# Patient Record
Sex: FEMALE | Race: WHITE | NOT HISPANIC OR LATINO
[De-identification: names, ages, dates, MRNs, and addresses within clinical notes are randomized per-mention and may not be internally consistent; named-entity substitution may affect disease eponyms.]

---

## 2018-10-24 ENCOUNTER — RESULT REVIEW (OUTPATIENT)
Age: 58
End: 2018-10-24

## 2021-01-12 ENCOUNTER — TRANSCRIPTION ENCOUNTER (OUTPATIENT)
Age: 61
End: 2021-01-12

## 2021-02-09 ENCOUNTER — TRANSCRIPTION ENCOUNTER (OUTPATIENT)
Age: 61
End: 2021-02-09

## 2021-11-29 PROBLEM — Z00.00 ENCOUNTER FOR PREVENTIVE HEALTH EXAMINATION: Status: ACTIVE | Noted: 2021-11-29

## 2022-02-03 ENCOUNTER — APPOINTMENT (OUTPATIENT)
Dept: OBGYN | Facility: CLINIC | Age: 62
End: 2022-02-03
Payer: COMMERCIAL

## 2022-02-03 VITALS
HEIGHT: 62 IN | DIASTOLIC BLOOD PRESSURE: 90 MMHG | WEIGHT: 163 LBS | SYSTOLIC BLOOD PRESSURE: 152 MMHG | BODY MASS INDEX: 30 KG/M2

## 2022-02-03 DIAGNOSIS — Z01.419 ENCOUNTER FOR GYNECOLOGICAL EXAMINATION (GENERAL) (ROUTINE) W/OUT ABNORMAL FINDINGS: ICD-10-CM

## 2022-02-03 PROCEDURE — 99386 PREV VISIT NEW AGE 40-64: CPT

## 2022-02-06 LAB — HPV HIGH+LOW RISK DNA PNL CVX: NOT DETECTED

## 2022-02-10 LAB — CYTOLOGY CVX/VAG DOC THIN PREP: ABNORMAL

## 2022-08-21 DIAGNOSIS — R73.03 PREDIABETES.: ICD-10-CM

## 2022-08-21 DIAGNOSIS — Z87.19 PERSONAL HISTORY OF OTHER DISEASES OF THE DIGESTIVE SYSTEM: ICD-10-CM

## 2022-08-21 DIAGNOSIS — Z80.9 FAMILY HISTORY OF MALIGNANT NEOPLASM, UNSPECIFIED: ICD-10-CM

## 2022-08-26 ENCOUNTER — APPOINTMENT (OUTPATIENT)
Dept: CARDIOLOGY | Facility: CLINIC | Age: 62
End: 2022-08-26

## 2022-08-26 ENCOUNTER — NON-APPOINTMENT (OUTPATIENT)
Age: 62
End: 2022-08-26

## 2022-08-26 VITALS
OXYGEN SATURATION: 97 % | BODY MASS INDEX: 29.81 KG/M2 | SYSTOLIC BLOOD PRESSURE: 130 MMHG | HEART RATE: 83 BPM | DIASTOLIC BLOOD PRESSURE: 88 MMHG | WEIGHT: 163 LBS

## 2022-08-26 DIAGNOSIS — Z86.79 PERSONAL HISTORY OF OTHER DISEASES OF THE CIRCULATORY SYSTEM: ICD-10-CM

## 2022-08-26 DIAGNOSIS — Z86.39 PERSONAL HISTORY OF OTHER ENDOCRINE, NUTRITIONAL AND METABOLIC DISEASE: ICD-10-CM

## 2022-08-26 PROCEDURE — 99204 OFFICE O/P NEW MOD 45 MIN: CPT

## 2022-08-26 PROCEDURE — 93000 ELECTROCARDIOGRAM COMPLETE: CPT

## 2022-08-27 ENCOUNTER — NON-APPOINTMENT (OUTPATIENT)
Age: 62
End: 2022-08-27

## 2022-08-27 PROBLEM — Z86.79 HISTORY OF HYPERTENSION: Status: RESOLVED | Noted: 2022-08-27 | Resolved: 2022-08-27

## 2022-08-27 PROBLEM — Z86.39 HISTORY OF HYPERLIPIDEMIA: Status: RESOLVED | Noted: 2022-08-26 | Resolved: 2022-08-27

## 2022-08-27 RX ORDER — ATENOLOL 50 MG/1
TABLET ORAL
Refills: 0 | Status: ACTIVE | COMMUNITY

## 2022-08-29 RX ORDER — HYDROCHLOROTHIAZIDE 12.5 MG/1
TABLET ORAL
Refills: 0 | Status: ACTIVE | COMMUNITY
Start: 2022-07-16

## 2022-08-29 NOTE — PHYSICAL EXAM
[Normal Conjunctiva] : normal conjunctiva [Normal S1, S2] : normal S1, S2 [Clear Lung Fields] : clear lung fields [Soft] : abdomen soft [Non Tender] : non-tender [Normal Bowel Sounds] : normal bowel sounds [Normal Gait] : normal gait [de-identified] : Appears in no distress lying flat [de-identified] : No carotid bruit. No jugular venous distention [de-identified] : No murmur. No gallop. No diastolic sounds. [de-identified] : full range of motion [de-identified] : dorsalis pedis pulses +2 bilaterally. Feet warm and well-perfused. No ulcerations. No peripheral edema

## 2022-08-29 NOTE — HISTORY OF PRESENT ILLNESS
[FreeTextEntry1] : 62-year-old female\par Cardiology consultation requested by Dr. Lane because of a diagnosis of non-arteritic ischemic optic neuropoathy\par \par Olya has no known heart disease. There is no history of myocardial infarction angina congestive heart failure or cerebrovascular accident. A screening stress echocardiographic study in 10/19 was normal. The left ventricle ejection fraction was greater than 60%.\par \par In 7/22 ago there was sudden loss of vision in one aspect of the left eye.  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 There has been no improvement in visual acuity\par \par An 8 /22  carotid CTA showed no evidence of greater than 50% stenosis of the internal carotid arteries. Atherosclerotic calcifications were seen at the left carotid bifurcation\par \par There is a prior history of hypertension and prediabetes. There is no history of smoking or illicit drug use. Her grandfather had a myocardial infarction.\par \par Enma denies chest pain, shortness of breath, palpitations or syncope.\par \par Mrs Caldwell  presents today for cardiovascular evaluation

## 2022-08-29 NOTE — DISCUSSION/SUMMARY
[FreeTextEntry1] : Visual disturbance\par In 7/22 ago there was sudden loss of vision in one aspect of the left eye.  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 There has been no improvement in visual acuity\par \par An 8 /22  carotid CTA showed no evidence of greater than 50% stenosis of the internal carotid arteries. Atherosclerotic calcifications were seen at the left carotid bifurcation\par \par Noninvasive cardiac studies would be helpful for further evaluation\par \par I have recommended the following\par a. Risk factor modification\par b. Noninvasive cardiac studies to include\par   1. echocardiogram\par   2 . Exercise treadmill ECG study\par \par \par \par \par Hyperlipidemia\par Hyperlipidemia represents a risk factor for atherosclerotic heart disease. The target LDL level for primary prevention is about 100. Despite diet exercise and  weight loss lipid levels have been elevated.  In 3/22 the serum cholesterol level was 239 HDL 60 triglycerides 136 and . HMG co A  reductase inhibitor therapy is indicated in an effort to obtain  more optimal levels. Nonpharmacological therapy, specifically diet exercise and weight loss are  emphasized   as major aspects of treatment.\par \par I have recommended the following\par a. Low fat low cholesterol  diabetic  diet. Regular aerobic exercise and weight loss\par b. Target LDL  level to  about 100 as discussed above\par c. Atorvastatin 10 mg/dayy with further dose adjustment dictated by tolerance and response\par d. Screening stress treadmill ECG study.\par \par \par \par Hypertension\par Hypertension is reportedly  controlled on the present medical regimen. In the setting of risk factors for atherosclerotic heart disease and prediabetes  ACE-I/ARB therapy is attractive.\par \par I have recommended  the following:\par \par a. Low-fat low-cholesterol diabetic diet. Regular aerobic exercise weight loss\par b. Continue the present medical regimen\par c. Addition of ACE-I./ARB therapy if required to maintain optimal levels\par d. Home blood pressure monitoring\par \par \par \par \par \par \par Valvular heart disease\par The 10/19 echocardiogram revealed mild mitral regurgitation. The pulmonary systolic pressure was normal at 23 mmHg. Left ventricular ejection fraction was greater than 60% The present cardiac physical examination is not suggestive is severe mitral regurgitation. Echocardiography would be helpful to reassess left ventricular and valvular function\par \par I have  recommended  the following\par a. Echocardiogram\par \par \par \par \par Overweight\par Despite significant recent weight loss  Enma  remains overweight. Today Mrs. Caldwell is 5 feet 2 inches tall and weighs 163 pounds. Diet exercise and weight loss are advised.\par \par \par \par \par \par The diagnosis, prognosis, risks, options and alternatives were explained at length to the patient. All questions were answered. Issues discussed included atherosclerotic heart disease hyperlipidemia hypertension visual disturbance noninvasive cardiac testing diet and exercise

## 2022-08-29 NOTE — REVIEW OF SYSTEMS
[Feeling Fatigued] : feeling fatigued [Joint Pain] : joint pain [Negative] : Heme/Lymph [FreeTextEntry3] : see history of present illness [FreeTextEntry5] : see history of present illness [de-identified] : see  history of present illness

## 2022-09-02 ENCOUNTER — APPOINTMENT (OUTPATIENT)
Dept: NEUROLOGY | Facility: CLINIC | Age: 62
End: 2022-09-02

## 2022-09-09 ENCOUNTER — NON-APPOINTMENT (OUTPATIENT)
Age: 62
End: 2022-09-09

## 2022-09-12 ENCOUNTER — RESULT REVIEW (OUTPATIENT)
Age: 62
End: 2022-09-12

## 2022-09-13 ENCOUNTER — APPOINTMENT (OUTPATIENT)
Dept: NEUROLOGY | Facility: CLINIC | Age: 62
End: 2022-09-13

## 2022-09-14 ENCOUNTER — NON-APPOINTMENT (OUTPATIENT)
Age: 62
End: 2022-09-14

## 2022-09-14 DIAGNOSIS — R93.89 ABNORMAL FINDINGS ON DIAGNOSTIC IMAGING OF OTHER SPECIFIED BODY STRUCTURES: ICD-10-CM

## 2022-09-20 ENCOUNTER — RESULT REVIEW (OUTPATIENT)
Age: 62
End: 2022-09-20

## 2022-09-20 ENCOUNTER — TRANSCRIPTION ENCOUNTER (OUTPATIENT)
Age: 62
End: 2022-09-20

## 2022-09-26 ENCOUNTER — APPOINTMENT (OUTPATIENT)
Dept: NEUROLOGY | Facility: CLINIC | Age: 62
End: 2022-09-26

## 2022-09-26 VITALS
HEIGHT: 62 IN | WEIGHT: 163 LBS | SYSTOLIC BLOOD PRESSURE: 128 MMHG | DIASTOLIC BLOOD PRESSURE: 82 MMHG | OXYGEN SATURATION: 98 % | HEART RATE: 70 BPM | BODY MASS INDEX: 30 KG/M2 | TEMPERATURE: 98.7 F

## 2022-09-26 DIAGNOSIS — Z78.9 OTHER SPECIFIED HEALTH STATUS: ICD-10-CM

## 2022-09-26 DIAGNOSIS — Z81.8 FAMILY HISTORY OF OTHER MENTAL AND BEHAVIORAL DISORDERS: ICD-10-CM

## 2022-09-26 PROCEDURE — 99215 OFFICE O/P EST HI 40 MIN: CPT

## 2022-09-26 NOTE — ASSESSMENT
[FreeTextEntry1] : Impression: optic neuritis: possibly related to COVID infection as symptom onset corresponded to getting COVID. Could be idiopathic. Less likely autoimmune or paraneoplastic. Improving with steroids. Low suspicion for meningioma given appearance and improvement with steroids \par \par Please continue prednisone with gradual taper (60 mg with taper by 10 mg q week) --> Let us know if worsening in vision. Vision can worsen if steroids are tapered too rapidly with perineuritis. \par \par Take with pantoprazole \par Continue aspirin 81 mg \par Monitor blood sugar with primary care doctor \par \par Please follow-up with Dr. Vázquez  - neurosurgery  \par Please follow-up with hematology - Dr. Valdez\par Please follow-up with primary care doctor and Dr. Benitez - elevated inflammatory markers\par Please follow-up with neuro-ophthalmology\par \par Take vitamin D 1000 units daily \par \par RTC in 1 month to see Dr. Costa - neurology\par

## 2022-09-26 NOTE — CONSULT LETTER
[Dear  ___] : Dear ~LIGIA, [Courtesy Letter:] : I had the pleasure of seeing your patient, [unfilled], in my office today. [Please see my note below.] : Please see my note below. [Sincerely,] : Sincerely, [FreeTextEntry3] : Martín Marie MD \par [facility] \par

## 2022-09-26 NOTE — DATA REVIEWED
[de-identified] : 9/2022 \par MRI Brain/Orbits: \par FINDINGS: \par MRI brain:\par Diffusion imaging is negative for recent infarct. Susceptibility images show no parenchymal blood product deposition. There are preserved large vessel flow-voids. Ventricles, sulci and cisterns are normal in size and configuration. T2-FLAIR imaging is unremarkable aside from scattered minute foci of subcortical white matter signal which is nonspecific but statistically most likely manifestation of minor small vessel ischemia, essentially unremarkable and not unexpected for age. There is no mass or fluid collection. There is no abnormal enhancement within the brain parenchyma or elsewhere in the intracranial compartment.\par MRI orbits:\par On the T2-weighted images, no pathologic signal or gross enlargement is identified of either optic nerve. However, on postcontrast images, there is considerable perineural enhancement at the mid and posterior segments of the optic nerve, for example seen is a rind of enhancing tissue surrounding the nerve on series 25, image 28 and with linear extent along the nerve seen on axial series 23, image 14. Caliber of the nerve is similar to the right without neural compression. Enhancement is thin and not nodular, and no calcification is seen on recent CT to suggest meningioma. There is corresponding hazy and subtle STIR signal abnormality at this site on series 20 within and intraconal location. No fluid collection. Enhancement appears to terminate at the optic canal, without pathologic enhancement seen of the intracranial optic nerve, nor is there gross signal abnormality or mass effect on the optic chiasm. The right orbits shows no abnormal enhancement. Globes appear symmetrically intact, and there is no gross abnormality of the extraocular muscles or lacrimal glands.\par IMPRESSION: \par On orbit MRI, there is ill-defined enhancement surrounding the left optic nerve at its mid and posterior orbital segments, without gross intraneural enhancement. 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 Sarcoidosis, IgG4 disease, SLE, IBD may manifest with orbital inflammation. No mass lesion or compression of the nerve. Correlate for a response to steroids.\par Brain MRI is essentially unremarkable. No intracranial mass.\par \par MRI C-Spine\par FINDINGS: \par Bones/joints: Straightened cervical curvature with normal vertebral body \par heights and alignments. \par Spinal cord: Normal signal. No cord compression. \par Discs/Spinal canal/Neural foramina: C4-C7 small disc bulges cause up to mild \par stenosis. \par Vasculature: Expected flow voids in the vertebral arteries.  \par Soft tissues: Unremarkable \par IMPRESSION: \par Mild cervical spondylosis. No acute abnormalities.\par \par MRI T spine \par \par \par FINDINGS: \par Bones/joints: Unremarkable. \par Spinal cord: Normal signal. No cord compression. \par Discs/Spinal canal/Neural foramina:  No significant disc disease. No \par significant spinal canal stenosis. \par Soft tissues: Unremarkable. \par IMPRESSION: \par Unremarkable spine.\par \par CTA head/neck \par FINDINGS: \par Moderate bilateral cavernous carotid artery calcifications without high-grade stenosis.\par ANTERIOR CIRCULATION: \par Right internal carotid artery: Unremarkable. Intracranial segment is patent with no significant stenosis. No aneurysm. \par Right middle cerebral artery: Unremarkable. No occlusion or significant stenosis. No aneurysm.  \par Right anterior cerebral artery: Unremarkable. No occlusion or significant stenosis. No aneurysm.  \par \par Left internal carotid artery: Unremarkable. Intracranial segment is patent with no significant stenosis. No aneurysm. \par Left middle cerebral artery: Unremarkable. No occlusion or significant stenosis. No aneurysm.  \par Left anterior cerebral artery: Unremarkable. No occlusion or significant stenosis. No aneurysm.  \par \par POSTERIOR CIRCULATION: \par Right vertebral artery: Unremarkable. No occlusion or significant stenosis. No aneurysm.  \par Left vertebral artery: Unremarkable. No occlusion or significant stenosis. No aneurysm.  \par Basilar artery: Unremarkable. No occlusion or significant stenosis. No aneurysm. \par Right posterior cerebral artery: Fetal origin of the right posterior cerebral artery is a common developmental variant and there is no occlusion or significant stenosis. No aneurysm. \par Left posterior cerebral artery: Unremarkable. No occlusion or significant stenosis. No aneurysm.  \par \par \par IMPRESSION: \par No large vessel stenosis or occlusion detected involving the major branches of the anterior or posterior intracranial circulation. \par \par \par =========================\par \par \par Exam: CTA Neck With Contrast \par Exam date and time: 8/12/2022 7:29 PM \par Age: 62 years old Clinical indication: Visual disturbance \par \par TECHNIQUE: Imaging protocol: Computed tomographic angiography of the neck with contrast. \par \par COMPARISON: CT HEAD 8/12/2022 7:26 PM \par \par FINDINGS: \par Right common carotid artery: No stenosis. No dissection or occlusion. \par Right internal carotid artery: Atherosclerotic calcifications are seen at the right carotid bifurcation and involving the proximal segment of the right internal carotid artery with no evidence of 50% or greater stenosis of the extracranial segment. No dissection or occlusion. \par Right external carotid artery: No occlusion or stenosis of the origin.  \par \par Left common carotid artery: No stenosis. No dissection or occlusion. \par Left internal carotid artery: Atherosclerotic calcifications are seen at the left carotid bifurcation with no evidence of 50% or greater stenosis of the extracranial segment. No dissection or occlusion. \par Left external carotid artery: No occlusion or stenosis of the origin.  \par \par Right vertebral artery: No stenosis. No dissection or occlusion. \par Left vertebral artery: No stenosis. No dissection or occlusion. \par \par Soft tissues: Normal. No significant soft tissue swelling. \par \par Bones/joints: No acute fracture. \par \par IMPRESSION: \par No evidence of 50% or greater stenosis involving the cervical segments of the \par right or left internal carotid arteries by NASCET criteria. \par \par  [de-identified] : 9/21/22; \par \par TSH 0.22, Free T4 0.96, Ferritin 446 high, ESR 37: high, CSF C-reactive protein 10.1, CSF  high \par Vitamin D: 29.7 \par \par Serum MOG antibody negative, serum NMO negative, serum IgG index: WNL \par Serum SSA, SSB negative,  Scleroderma antigody negative CSF MBP pening, CSF oligoclonal band pending CSF ACE pending, CSF lyme, CSF MOG pending, CSF HSV pending\par Serum RF negative, PEÑA negative, P-anca - negatie, Atypical anca indeterminate \par NMO serum antibody negative \par \par CSF WBC: 2--> 1, CSF protein: 36, CSF glucose 99 \par CSF gram statin, culture - no growth \par CSF Cytology: negative for malignancy \par \par 9/18/22:  COVID positive \par 9/22/22: COVID negative

## 2022-09-26 NOTE — DISCUSSION/SUMMARY
[FreeTextEntry1] : 62-year-old right-handed woman with a past medical history of HTN, HLD, preDM who presents with worsening of intermittent left eye vision changes x 2 months. 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 Perineuritis can be idiopathic or associated with autoimmune disease - lower association with demyelinating disease such as MS. \par \par MRI brain/cervical spine/thoracic spine w/wo contrast negative for other inflammatory lesions or longitudinally extensive optic neuritis. CSF was bland with normal WBC and protein. CSF cytology negative. Serum NMO, MOG negative. CSF oligoclonal band, MOG, NMO pending \par \par Serologic work-up for autoimmune disease negative (PEÑA, SSA, ANCA, RF). Inflammatory markers are elevated - LDH, ESR, CPR, ferritin. Inflammatory markers are up in setting of positive COVID infection. \par \par Perineuritis can be associated with  IgG4 related disease, giant cell arteritis, Behcets diseae, HSV, herpes zoster, granulomatosis disease with polyangiitis, lupus, inflammatory bowel disease, tuberculosis, syphilis, HSV, herpes zoster viral encephalitis , primary or metastatic malignancy , Graves Disease. \par \par CT chest/abdomen and pelvis if above tests unrevealing (malignancy work-up), consideration of special send out testing for IgG4. NMO, MOG in process. Elevated inflammatory markers although confound is COVID positive  - she is asymptomatic. MRI brain/spine without lesions. \par

## 2022-09-26 NOTE — HISTORY OF PRESENT ILLNESS
[FreeTextEntry1] : Enma is a 62-year-old right-handed woman with a past medical history of pre-diabetes, recently diagnosed left optic perineuritis and hypertension presenting for hospital follow-up. \par She was hospitalized at [facility] from -22. \par \par She reports that around , she didn't feel well. She tested positive for COVID. Symptoms were just a mild cold. No shortness of breath. The following week, she noticed that vision in her left eye was blurry. It lasted about a day or so. A few days later, she noticed a black spot in the left eye nasal visual field. She went to an ophthalmologist who didn't see papillitis/anything wrong and asked her to come to the emergency room. She came to the emergency room at [facility] on 22. She had a CTA head/neck which was normal. She was recommended to get a MRI orbits/brain as outpatient. \par \par She returned to [facility] ED on  reporting worsening vision in left eye with a black spot and discomfort. She was admitted and had an MRI of brain which showed appearance of possible left optic perineuritis. She tested positive for COVID but was asymptomatic. \par She was treated with 3 days of high dose solumedrol 1 g and reported improvement in vision and discomfort the next day. MRI cervical and thoracic spine without lesions. She had an IR guided LP which was bland. Oligoclonal bands, NMO, and MOG in CSF are still pending. CSF cytology and cultures were negative.\par Rheumatologic labs were unrevealing. Inflammatory markers including ESR, CRP, ferritin, LDH were up. \par \par She is presenting for follow-up. She is taking prednisone with taper by 10 mg as instructed. She is taking PPI and aspirin 81 mg (instructed because inflammatory markers are up). \par She has follow-up with primary care and with cardiologist coming up. 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 \par \par No side-effects from prednisone apart from insomnia. She is not drinking alcohol. \par \par No history of blood clots. No history of spontaneous miscarriage. Pap smears were up to date. Keeping up with mammograms. No history of cancer. \par Vaccinated against COVID x2 and has one booster shot. \par \par Past Medical History\par pre-diabetes\par hypertension\par \par Surgeries: \par \par \par Social History\par Used to drink wine, a couple glasses a day\par No cigarettes \par Retired, used to work as a dental hygienist \par Drives \par \par Medications:\par vitamin D 1000 units qd \par aspirin 81\par pantoprazole\par prednisone 60 mg qd \par metformin 500 mg qd\par hctz 25 mg qd \par atenolol 25 mg qd\par atorvastatin 10 mg qd\par dorzolamide? \par \par Primary Care Doctor:\par Dr. Lane \par Dr. Gaurang Smith - neuroophthalmology\par \par Family History: \par Mother -  - 87 Alzheimers\par Father - - 67 esophageal cancer\par Brother- non pertinent medical history\par 3 healthy children \par \par Investigations: \par \par MRI Brain/Orbits: \par FINDINGS: \par MRI brain:\par Diffusion imaging is negative for recent infarct. Susceptibility images show no parenchymal blood product deposition. There are preserved large vessel flow-voids. Ventricles, sulci and cisterns are normal in size and configuration. T2-FLAIR imaging is unremarkable aside from scattered minute foci of subcortical white matter signal which is nonspecific but statistically most likely manifestation of minor small vessel ischemia, essentially unremarkable and not unexpected for age. There is no mass or fluid collection. There is no abnormal enhancement within the brain parenchyma or elsewhere in the intracranial compartment.\par MRI orbits:\par On the T2-weighted images, no pathologic signal or gross enlargement is identified of either optic nerve. However, on postcontrast images, there is considerable perineural enhancement at the mid and posterior segments of the optic nerve, for example seen is a rind of enhancing tissue surrounding the nerve on series 25, image 28 and with linear extent along the nerve seen on axial series 23, image 14. Caliber of the nerve is similar to the right without neural compression. Enhancement is thin and not nodular, and no calcification is seen on recent CT to suggest meningioma. There is corresponding hazy and subtle STIR signal abnormality at this site on series 20 within and intraconal location. No fluid collection. Enhancement appears to terminate at the optic canal, without pathologic enhancement seen of the intracranial optic nerve, nor is there gross signal abnormality or mass effect on the optic chiasm. The right orbits shows no abnormal enhancement. Globes appear symmetrically intact, and there is no gross abnormality of the extraocular muscles or lacrimal glands.\par IMPRESSION: \par On orbit MRI, there is ill-defined enhancement surrounding the left optic nerve at its mid and posterior orbital segments, without gross intraneural enhancement. 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 Sarcoidosis, IgG4 disease, SLE, IBD may manifest with orbital inflammation. No mass lesion or compression of the nerve. Correlate for a response to steroids.\par Brain MRI is essentially unremarkable. No intracranial mass.\par \par MRI C-Spine\par FINDINGS: \par Bones/joints: Straightened cervical curvature with normal vertebral body \par heights and alignments. \par Spinal cord: Normal signal. No cord compression. \par Discs/Spinal canal/Neural foramina: C4-C7 small disc bulges cause up to mild \par stenosis. \par Vasculature: Expected flow voids in the vertebral arteries.  \par Soft tissues: Unremarkable \par IMPRESSION: \par Mild cervical spondylosis. No acute abnormalities.\par \par MRI T spine \par \par \par FINDINGS: \par Bones/joints: Unremarkable. \par Spinal cord: Normal signal. No cord compression. \par Discs/Spinal canal/Neural foramina:  No significant disc disease. No \par significant spinal canal stenosis. \par Soft tissues: Unremarkable. \par IMPRESSION: \par Unremarkable spine.\par \par CTA head/neck \par FINDINGS: \par Moderate bilateral cavernous carotid artery calcifications without high-grade stenosis.\par ANTERIOR CIRCULATION: \par Right internal carotid artery: Unremarkable. Intracranial segment is patent with no significant stenosis. No aneurysm. \par Right middle cerebral artery: Unremarkable. No occlusion or significant stenosis. No aneurysm.  \par Right anterior cerebral artery: Unremarkable. No occlusion or significant stenosis. No aneurysm.  \par \par Left internal carotid artery: Unremarkable. Intracranial segment is patent with no significant stenosis. No aneurysm. \par Left middle cerebral artery: Unremarkable. No occlusion or significant stenosis. No aneurysm.  \par Left anterior cerebral artery: Unremarkable. No occlusion or significant stenosis. No aneurysm.  \par \par POSTERIOR CIRCULATION: \par Right vertebral artery: Unremarkable. No occlusion or significant stenosis. No aneurysm.  \par Left vertebral artery: Unremarkable. No occlusion or significant stenosis. No aneurysm.  \par Basilar artery: Unremarkable. No occlusion or significant stenosis. No aneurysm. \par Right posterior cerebral artery: Fetal origin of the right posterior cerebral artery is a common developmental variant and there is no occlusion or significant stenosis. No aneurysm. \par Left posterior cerebral artery: Unremarkable. No occlusion or significant stenosis. No aneurysm.  \par \par \par IMPRESSION: \par No large vessel stenosis or occlusion detected involving the major branches of the anterior or posterior intracranial circulation. \par \par \par =========================\par \par \par Exam: CTA Neck With Contrast \par Exam date and time: 2022 7:29 PM \par Age: 62 years old Clinical indication: Visual disturbance \par \par TECHNIQUE: Imaging protocol: Computed tomographic angiography of the neck with contrast. \par \par COMPARISON: CT HEAD 2022 7:26 PM \par \par FINDINGS: \par Right common carotid artery: No stenosis. No dissection or occlusion. \par Right internal carotid artery: Atherosclerotic calcifications are seen at the right carotid bifurcation and involving the proximal segment of the right internal carotid artery with no evidence of 50% or greater stenosis of the extracranial segment. No dissection or occlusion. \par Right external carotid artery: No occlusion or stenosis of the origin.  \par \par Left common carotid artery: No stenosis. No dissection or occlusion. \par Left internal carotid artery: Atherosclerotic calcifications are seen at the left carotid bifurcation with no evidence of 50% or greater stenosis of the extracranial segment. No dissection or occlusion. \par Left external carotid artery: No occlusion or stenosis of the origin.  \par \par Right vertebral artery: No stenosis. No dissection or occlusion. \par Left vertebral artery: No stenosis. No dissection or occlusion. \par \par Soft tissues: Normal. No significant soft tissue swelling. \par \par Bones/joints: No acute fracture. \par \par IMPRESSION: \par No evidence of 50% or greater stenosis involving the cervical segments of the \par right or left internal carotid arteries by NASCET criteria. \par \par \par 22; \par \par TSH 0.22, Free T4 0.96, Ferritin 446 high, ESR 37: high, CSF C-reactive protein 10.1, CSF  high \par Vitamin D: 29.7 \par \par Serum MOG antibody negative, serum NMO negative, serum IgG index: WNL \par Serum SSA, SSB negative,  Scerlodermia antigody negative CSF MBP pening, CSF oligoclonal band pending CSF ACE pending, CSF lyme, CSF MOG pending, CSF HSV pending\par Serum RF negative, PEÑA negative, P-anca - negatie, Atypical anca indeterminate \par NMO serum antibody negative \par \par CSF WBC: 2--> 1, CSF protein: 36, CSF glucose 99 \par CSF gram statin, culture - no growth \par CSF Cytology: negative for malignancy \par \par 22 COVID negative \par 22:  COVID positive \par 22: COVID negative\par

## 2022-09-26 NOTE — PHYSICAL EXAM
[FreeTextEntry1] : Mental Status: AxOx3, speech: no dysarthria, euthymic affect, mood “good”, attention normal by serial sevens, can spell “world” backwards \par STM 3/3 immediate, 3/3 at five minutes, \par CN: visual acuity, left eye nasal superior quadrantanopia\par III, IV, VI, PERRL, EOMI \par V sensation normal to light touch, pinprick\par VII normal squint vs resistance, normal smile, face symmetric \par VIII: normal hearing\par IX, X normal gag, symmetric palate, uvula raises midline \par XI normal shrug versus resistance and lateralization of head versus resistance \par XII tongue symmetric, normal strength, no tremor fasciculations \par Motor: full strength throughout \par Sensory: normal to LT and vibration \par Reflexes \par Brachioradialis 2+, biceps 3+, triceps 2+, patella 3+ and ankles 2+ - all symmetric\par Plantar flexor response bilaterally \par Coordination: no dysmetria on FNF \par Gait : normal balance and gait, no ataxic movements, able to toe /heel/tandem \par \par

## 2022-10-03 ENCOUNTER — APPOINTMENT (OUTPATIENT)
Dept: CARDIOLOGY | Facility: CLINIC | Age: 62
End: 2022-10-03

## 2022-10-03 DIAGNOSIS — I34.0 NONRHEUMATIC MITRAL (VALVE) INSUFFICIENCY: ICD-10-CM

## 2022-10-03 PROCEDURE — 93306 TTE W/DOPPLER COMPLETE: CPT

## 2022-10-03 PROCEDURE — 36415 COLL VENOUS BLD VENIPUNCTURE: CPT

## 2022-10-07 DIAGNOSIS — Z86.79 PERSONAL HISTORY OF OTHER DISEASES OF THE CIRCULATORY SYSTEM: ICD-10-CM

## 2022-10-10 ENCOUNTER — APPOINTMENT (OUTPATIENT)
Dept: NEUROSURGERY | Facility: CLINIC | Age: 62
End: 2022-10-10

## 2022-10-10 VITALS
HEART RATE: 97 BPM | WEIGHT: 163 LBS | DIASTOLIC BLOOD PRESSURE: 89 MMHG | SYSTOLIC BLOOD PRESSURE: 149 MMHG | HEIGHT: 62 IN | BODY MASS INDEX: 30 KG/M2

## 2022-10-10 PROCEDURE — 99205 OFFICE O/P NEW HI 60 MIN: CPT

## 2022-10-10 NOTE — HISTORY OF PRESENT ILLNESS
[de-identified] : PAULA FERGUSON is a 62 year old right handed female with a PMH of  pre-DM (on meds now due to oral steroids), HTN, HLD, recently diagnosed left optic neuritis who presents to the office today at the request of her neurologist Dr. Marie for neurosurgical consultation due to left eye visual disturbance. On  she did not feel well and tested positive for COVID. The following week she developed left eye blurry vision with a black spot in left nasal visual field. She was evaluated at [facility] with normal head CT and discharged with plan for outpatient MRI brain and orbits with and without contrast  which was done on 22 (see detailed reports below). She then was admitted to [facility] - for worsening left eye vision and further work-up. LP CSK culture and cytology was negative. Rheumatology and hematology work-up still pending. She had evaluation by Dr. 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 She is still on prednisone taper, started at 60mg/day, currently taking 4mg/day. Today she reports her left eye vision is significantly improved. She no longer has overall left eye blurry vision, but continues to have the same black spot at left upper nasal visual field. The patient denies seizures, headaches, N/V, hearing deficits, numbness, weakness, bowel/bladder dysfunction or gait disturbance. \par \par \par PMH: per HPI\par PSH: \par FamHx: mother alzhiemer's, father esophageal cancer\par Social Hx: non-smoker, 3 glasses of wine daily,  live with , retired dental hygienist \par Allergies: malignant hyperthermia\par Medications: ASA 81mg, HCTZ 25, metformin 500mg, januvia, atenolol, atorvastatin\par \par Dr. Restrepo PCP\par Hematologist Dr. José Miguel plunkettt in November\par

## 2022-10-10 NOTE — DATA REVIEWED
[de-identified] : Exam: CT Head With Contrast \par Exam date and time: 8/12/2022 7:38 PM \par Age: 62 years old Clinical indication: Visual disturbance \par \par TECHNIQUE: Imaging protocol: Computed tomography of the head with intravenous contrast. \par \par COMPARISON: CT HEAD 8/12/2022 7:26 PM \par \par FINDINGS: \par Brain: Cerebral sulci show bilateral symmetry with no supratentorial mass or mass effect detected. Brainstem and cerebellum are unremarkable. There is no evidence of acute transcortical infarction or recent intracranial hemorrhage. \par No abnormal intracranial enhancement is detected. \par Cerebral ventricles: Ventricular and cisternal spaces are normal in size and configuration and there is no midline shift or hydrocephalus seen. Bones/joints: Bony calvarium and skull base are intact and no acute fractures are detected. \par Paranasal sinuses: Grossly clear throughout. \par Mastoid air cells: Grossly clear bilaterally. \par Soft tissues: Unremarkable. \par Consider MRI of the brain as clinically warranted.\par \par \par IMPRESSION: \par Unremarkable enhanced head CT with no evidence of an acute intracranial process.\par Preliminary report provided by [facility] José Manuel Felipe MD. \par \par --- End of Report ---\par \par ***Electronically Signed ***\par -----------------------------------------------\par Cathy Badillo MD              08/13/22 0912\par \par Dictated on 08/13/22\par \par \par Report cc:  Britt Robertson NP;\par  [de-identified] : Exam: CTA Head With Contrast, Arteriography \par Exam date and time: 8/12/2022 7:29 PM \par Age: 62 years old Clinical indication: Visual disturbance \par \par TECHNIQUE: \par Imaging protocol: Computed tomographic angiography of the head with contrast. \par Exam focused on the arteries. 3D rendering (Not supervised by radiologist): MIP and/or 3D reconstructed images were created by the technologist. \par \par COMPARISON: CT HEAD 8/12/2022 7:26 PM \par \par FINDINGS: \par Moderate bilateral cavernous carotid artery calcifications without high-grade stenosis.\par ANTERIOR CIRCULATION: \par Right internal carotid artery: Unremarkable. Intracranial segment is patent with no significant stenosis. No aneurysm. \par Right middle cerebral artery: Unremarkable. No occlusion or significant stenosis. No aneurysm.  \par Right anterior cerebral artery: Unremarkable. No occlusion or significant stenosis. No aneurysm.  \par \par Left internal carotid artery: Unremarkable. Intracranial segment is patent with no significant stenosis. No aneurysm. \par Left middle cerebral artery: Unremarkable. No occlusion or significant stenosis. No aneurysm.  \par Left anterior cerebral artery: Unremarkable. No occlusion or significant stenosis. No aneurysm.  \par \par POSTERIOR CIRCULATION: \par Right vertebral artery: Unremarkable. No occlusion or significant stenosis. No aneurysm.  \par Left vertebral artery: Unremarkable. No occlusion or significant stenosis. No aneurysm.  \par Basilar artery: Unremarkable. No occlusion or significant stenosis. No aneurysm. \par Right posterior cerebral artery: Fetal origin of the right posterior cerebral artery is a common developmental variant and there is no occlusion or significant stenosis. No aneurysm. \par Left posterior cerebral artery: Unremarkable. No occlusion or significant stenosis. No aneurysm.  \par \par \par IMPRESSION: \par No large vessel stenosis or occlusion detected involving the major branches of the anterior or posterior intracranial circulation. \par  [de-identified] : Exam Date: 	  09/12/22					\par Exam: 	MRI BRAIN WAW IC; MRI ORBITS/FACE OR NECK WAW IC					\par Order#:	[id number]; [id number]					\par             \par \par \par PROCEDURES:\par MRI examination of the brain with and without contrast.\par MRI examination of the orbits with and without contrast.\par \par INDICATION: Left vision loss\par \par TECHNIQUE: Brain MR: Axial volumetric SPGR and sagittal volumetric T2-FLAIR imaging is obtained with MPR provided for each set. Axial T2, SWI and diffusion imaging of the brain is obtained. After 7 cc Gadavist given intravenously, axial volumetric SPGR imaging of the brain is repeated with MPR provided and sagittal T1 and T2-FLAIR spin-echo images are also obtained of the brain.\par \par Orbit MR: Axial oblique T1 and T2 images through the orbits are obtained as well as coronal T1, coronal inversion recovery, and bilateral oblique sagittal T2 series. In addition, steady-state free precession (CISS) images are obtained to the cranial nerves. After contrast given (type and dose above), axial and coronal T1 fat-saturated images of the orbits are obtained.\par \par COMPARISON: No prior MR. CT head from 08/12/2022 is reviewed\par \par FINDINGS: \par \par MRI brain:\par \par Diffusion imaging is negative for recent infarct. Susceptibility images show no parenchymal blood product deposition. There are preserved large vessel flow-voids. Ventricles, sulci and cisterns are normal in size and configuration. T2-FLAIR imaging is unremarkable aside from scattered minute foci of subcortical white matter signal which is nonspecific but statistically most likely manifestation of minor small vessel ischemia, essentially unremarkable and not unexpected for age. There is no mass or fluid collection. There is no abnormal enhancement within the brain parenchyma or elsewhere in the intracranial compartment.\par \par MRI orbits:\par \par On the T2-weighted images, no pathologic signal or gross enlargement is identified of either optic nerve. However, on postcontrast images, there is considerable perineural enhancement at the mid and posterior segments of the optic nerve, for example seen is a rind of enhancing tissue surrounding the nerve on series 25, image 28 and with linear extent along the nerve seen on axial series 23, image 14. Caliber of the nerve is similar to the right without neural compression. Enhancement is thin and not nodular, and no calcification is seen on recent CT to suggest meningioma. There is corresponding hazy and subtle STIR signal abnormality at this site on series 20 within and intraconal location. No fluid collection. Enhancement appears to terminate at the optic canal, without pathologic enhancement seen of the intracranial optic nerve, nor is there gross signal abnormality or mass effect on the optic chiasm. The right orbits shows no abnormal enhancement. Globes appear symmetrically intact, and there is no gross abnormality of the extraocular muscles or lacrimal glands.\par \par \par IMPRESSION: \par \par On orbit MRI, there is ill-defined enhancement surrounding the left optic nerve at its mid and posterior orbital segments, without gross intraneural enhancement. 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 Sarcoidosis, IgG4 disease, SLE, IBD may manifest with orbital inflammation. No mass lesion or compression of the nerve. Correlate for a response to steroids.\par \par Brain MRI is essentially unremarkable. No intracranial mass.\par \par --- End of Report ---\par \par ***Electronically Signed ***\par -----------------------------------------------\par Chacorta Celaya MD              09/13/22 1656\par \par Dictated on 09/13/22\par \par \par Report cc:  Denver Sanchez MD;\par \par

## 2022-10-10 NOTE — ASSESSMENT
[FreeTextEntry1] : I have discussed the natural history and treatment options for optic nerve sheath meningiomas vs optic neuritis with the patient. I explained the different types of meningiomas and the indications for observation and imaging surveillance, medical management with antiepileptic medications and steroids, chemotherapy, radiation therapy, radiosurgery and surgery. I explained the indications of a combination of these treatment options. \par In the end, I recommend repeat MRI brain and orbits with and without contrast. Once done, we will present her case at our multidisciplinary brain tumor board and call her with recommendations. The patient understands the plan of care and is in agreement.  All questions answered to patient satisfaction.

## 2022-10-10 NOTE — END OF VISIT
[FreeTextEntry3] : I have seen the patient and reviewed the case together with PA and I agree with the final recommendations and plan of care.\par \par Leonel Vázquez MD\par Neurosurgery\par \par  [Time Spent: ___ minutes] : I have spent [unfilled] minutes of time on the encounter. [>50% of the face to face encounter time was spent on counseling and/or coordination of care for ___] : Greater than 50% of the face to face encounter time was spent on counseling and/or coordination of care for [unfilled]

## 2022-10-17 ENCOUNTER — APPOINTMENT (OUTPATIENT)
Dept: CARDIOLOGY | Facility: CLINIC | Age: 62
End: 2022-10-17

## 2022-10-23 ENCOUNTER — TRANSCRIPTION ENCOUNTER (OUTPATIENT)
Age: 62
End: 2022-10-23

## 2022-10-25 PROBLEM — D72.829 LEUKOCYTOSIS: Status: ACTIVE | Noted: 2022-10-25

## 2022-10-26 ENCOUNTER — RESULT REVIEW (OUTPATIENT)
Age: 62
End: 2022-10-26

## 2022-10-28 ENCOUNTER — RESULT REVIEW (OUTPATIENT)
Age: 62
End: 2022-10-28

## 2022-10-28 ENCOUNTER — APPOINTMENT (OUTPATIENT)
Dept: HEMATOLOGY ONCOLOGY | Facility: CLINIC | Age: 62
End: 2022-10-28

## 2022-10-28 VITALS
HEIGHT: 62 IN | TEMPERATURE: 97.3 F | WEIGHT: 165 LBS | BODY MASS INDEX: 30.36 KG/M2 | OXYGEN SATURATION: 97 % | RESPIRATION RATE: 16 BRPM | HEART RATE: 95 BPM | DIASTOLIC BLOOD PRESSURE: 84 MMHG | SYSTOLIC BLOOD PRESSURE: 144 MMHG

## 2022-10-28 DIAGNOSIS — D72.829 ELEVATED WHITE BLOOD CELL COUNT, UNSPECIFIED: ICD-10-CM

## 2022-10-28 PROCEDURE — 99205 OFFICE O/P NEW HI 60 MIN: CPT | Mod: 25

## 2022-10-28 PROCEDURE — 36415 COLL VENOUS BLD VENIPUNCTURE: CPT

## 2022-10-28 RX ORDER — METFORMIN HYDROCHLORIDE 625 MG/1
TABLET ORAL
Refills: 0 | Status: COMPLETED | COMMUNITY
End: 2022-10-28

## 2022-10-28 RX ORDER — MELATONIN 3 MG
25 MCG TABLET ORAL
Refills: 0 | Status: ACTIVE | COMMUNITY
Start: 2022-10-28

## 2022-10-28 RX ORDER — KRILL/OM-3/DHA/EPA/PHOSPHO/AST 1000-230MG
81 CAPSULE ORAL
Refills: 0 | Status: ACTIVE | COMMUNITY
Start: 2022-10-28

## 2022-10-28 RX ORDER — PANTOPRAZOLE 40 MG/1
40 TABLET, DELAYED RELEASE ORAL
Qty: 30 | Refills: 0 | Status: ACTIVE | COMMUNITY
Start: 2022-09-22

## 2022-10-28 NOTE — HISTORY OF PRESENT ILLNESS
[de-identified] : Ms. Caldwell is a 62 year old woman who presents for abnormal blood work.\par She was admitted to [facility] -2022 due to recurrent vision loss of left eye and was seen by neuro, neuro surg, optha.  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 and was started on high dose steroids with aspirin\par Blood work drawn during admission reveal;ed WBC 19 (likely due to steroids), ferritin 449 and ESR 37 (possibly due to diffuse inflammation)\par \par Pending appt with rheum\par \par She reports improved, yet compromised vision to left eye, some constipation since most recent hospitalization \par \par Age of Menarche: 11\par Age of Menopause: 41\par OCP/HRT: denies\par \par \par Health Maintenance:\par 3 glasses of wine per night \par mammo Dec 2022 \par GYN 2022\par [facility] 5 years ago\par \par Retired dental hygienist

## 2022-10-28 NOTE — ASSESSMENT
[FreeTextEntry1] : # vision loss L eye\par unsure if it was an eye stroke vs other cause of vision loss\par no thrombosis on imaging however will do hypercoag work up including factor V leiden, prothrombin gene mutation, LA, b2 glycoprotein,cardiolipins, phosphatidyl serine ab, protein c and s, and antithrombin III\par continue work up with optho and neurology\par \par #elevated ESR/Ferritin and leukocytosis\par likely due to inflammation and steroids\par We have reviewed the ddx of leukocytosis including normal variation, infection, inflammation, medications (steroids and growth factors), asplenia, cigarette smoking, stress/excessive exercise, inflammation including obesity, thyroid issues, and Myeloproliferative neoplasms\par Will check cbc with diff, cmp, Peripheral smear, ESR/CRP, PEÑA, TSH, iron panel with ferritin, b12/folate, metzger: MPNR, BCR/ABL qualitative\par check iron, ferritin and hemochromatosis for elevated ferritin\par \par Tele in 2 weeks to review blood work\par

## 2022-10-31 ENCOUNTER — NON-APPOINTMENT (OUTPATIENT)
Age: 62
End: 2022-10-31

## 2022-11-01 ENCOUNTER — APPOINTMENT (OUTPATIENT)
Dept: NEUROLOGY | Facility: CLINIC | Age: 62
End: 2022-11-01

## 2022-11-07 ENCOUNTER — APPOINTMENT (OUTPATIENT)
Dept: HEMATOLOGY ONCOLOGY | Facility: CLINIC | Age: 62
End: 2022-11-07

## 2022-11-07 DIAGNOSIS — R73.03 PREDIABETES.: ICD-10-CM

## 2022-11-07 DIAGNOSIS — R70.0 ELEVATED ERYTHROCYTE SEDIMENTATION RATE: ICD-10-CM

## 2022-11-07 DIAGNOSIS — R79.89 OTHER SPECIFIED ABNORMAL FINDINGS OF BLOOD CHEMISTRY: ICD-10-CM

## 2022-11-07 DIAGNOSIS — E66.3 OVERWEIGHT: ICD-10-CM

## 2022-11-07 PROCEDURE — 99215 OFFICE O/P EST HI 40 MIN: CPT | Mod: 95

## 2022-11-07 NOTE — ASSESSMENT
[FreeTextEntry1] : # vision loss L eye\par unsure if it was an eye stroke vs other cause of vision loss\par no thrombosis \par continue work up with optho and neurology\par factor V leiden - negative\par prothrombin gene mutation  - negative\par LA  - negative\par b2 glycoprotein  - negative\par cardiolipins - negative\par phosphatidyl serine ab - negative\par protein c activity elevated -  deficiency puts one at risk from thrombosis. monitor\par protein s ag high and activity normal - only concerned for thrombosis if deficient. monitor\par antithrombin III ag slightly elevated - deficiency puts one at risk from thrombosis. monitor\par \par #elevated ESR/Ferritin and leukocytosis\par likely due to inflammation and steroids\par No evidence of CALR, MPL, JAK2\par BCR/ABL qualitative - Negative\par iron wnl\par ferritin elevated at 730\par \par # hemochromatosis - H63D carrier\par although unlikely MRI liver to assess for iron storage.\par  Advised to limit alcohol intake.\par TSH wnl \par Check DEXA scan to assess for osteoporosis\par Ferritin in this case is likely elevated as an acute phase reactant. will recheck in 3 months. If iron deposition or ferritin increases will recommend phlebotomy\par \par \par RTC in 3 months with cbc with diff, cmp, iron, ferritin, ESR/CRP, LDH, Hapto, retic,

## 2022-11-07 NOTE — HISTORY OF PRESENT ILLNESS
[Home] : at home, [unfilled] , at the time of the visit. [Medical Office: ([facility])___] : at the medical office located in  [Verbal consent obtained from patient] : the patient, [unfilled] [de-identified] : Ms. Caldwell is a 62 year old woman who presents for abnormal blood work.\par She was admitted to [place] -2022 due to recurrent vision loss of left eye and was seen by neuro, neuro surg, optha.  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 and was started on high dose steroids with aspirin\par Blood work drawn during admission reveal;ed WBC 19 (likely due to steroids), ferritin 449 and ESR 37 (possibly due to diffuse inflammation)\par \par Pending appt with rheum\par \par She reports improved, yet compromised vision to left eye, some constipation since most recent hospitalization \par \par Age of Menarche: 11\par Age of Menopause: 41\par OCP/HRT: denies\par \par \par Health Maintenance:\par 3 glasses of wine per night \par mammo Dec 2022 \par GYN 2022\par [facility] 5 years ago\par \par Retired dental hygienist  [de-identified] : Patient seen via telehealth\par feeling well. no complaints\par

## 2022-11-10 ENCOUNTER — APPOINTMENT (OUTPATIENT)
Dept: NEUROLOGY | Facility: CLINIC | Age: 62
End: 2022-11-10

## 2022-11-16 ENCOUNTER — APPOINTMENT (OUTPATIENT)
Dept: CARDIOLOGY | Facility: CLINIC | Age: 62
End: 2022-11-16

## 2022-11-16 PROCEDURE — 93015 CV STRESS TEST SUPVJ I&R: CPT

## 2022-11-17 ENCOUNTER — NON-APPOINTMENT (OUTPATIENT)
Age: 62
End: 2022-11-17

## 2022-11-28 ENCOUNTER — APPOINTMENT (OUTPATIENT)
Dept: NEUROLOGY | Facility: CLINIC | Age: 62
End: 2022-11-28

## 2022-12-14 ENCOUNTER — RX RENEWAL (OUTPATIENT)
Age: 62
End: 2022-12-14

## 2023-04-20 ENCOUNTER — RESULT REVIEW (OUTPATIENT)
Age: 63
End: 2023-04-20

## 2023-04-25 ENCOUNTER — APPOINTMENT (OUTPATIENT)
Dept: NEUROLOGY | Facility: CLINIC | Age: 63
End: 2023-04-25
Payer: COMMERCIAL

## 2023-04-25 VITALS
BODY MASS INDEX: 30.55 KG/M2 | HEART RATE: 76 BPM | OXYGEN SATURATION: 97 % | DIASTOLIC BLOOD PRESSURE: 86 MMHG | WEIGHT: 166 LBS | SYSTOLIC BLOOD PRESSURE: 143 MMHG | HEIGHT: 62 IN

## 2023-04-25 PROCEDURE — 99215 OFFICE O/P EST HI 40 MIN: CPT

## 2023-04-25 RX ORDER — PREDNISONE 2.5 MG/1
2.5 TABLET ORAL
Qty: 25 | Refills: 0 | Status: DISCONTINUED | COMMUNITY
Start: 2022-11-17 | End: 2023-04-25

## 2023-04-25 RX ORDER — PREDNISONE 10 MG/1
10 TABLET ORAL
Qty: 21 | Refills: 0 | Status: DISCONTINUED | COMMUNITY
Start: 2022-09-22 | End: 2023-04-25

## 2023-04-25 RX ORDER — PREDNISONE 5 MG/1
5 TABLET ORAL DAILY
Qty: 20 | Refills: 0 | Status: DISCONTINUED | COMMUNITY
Start: 2022-10-31 | End: 2023-04-25

## 2023-04-25 RX ORDER — SITAGLIPTIN 50 MG/1
50 TABLET, FILM COATED ORAL
Qty: 90 | Refills: 0 | Status: DISCONTINUED | COMMUNITY
Start: 2022-10-23 | End: 2023-04-25

## 2023-04-25 RX ORDER — DORZOLAMIDE HYDROCHLORIDE TIMOLOL MALEATE 20; 5 MG/ML; MG/ML
22.3-6.8 SOLUTION/ DROPS OPHTHALMIC
Qty: 10 | Refills: 0 | Status: DISCONTINUED | COMMUNITY
Start: 2022-08-12 | End: 2023-04-25

## 2023-04-25 NOTE — ASSESSMENT
[FreeTextEntry1] : Please get MRI orbits in October 2023  \par Will send 20 mg of prednisone (10 day supply) - to pharmacy. Do not take it if vision is not worsening. \par This is just for incase this happens while you are [place]\par \par If vision worsening, please call office immediately and discuss with Dr. Smith. \par \par Return to clinic in November 2023 \par

## 2023-04-25 NOTE — DISCUSSION/SUMMARY
[FreeTextEntry1] : 63-year-old right-handed woman with a past medical history of HTN, HLD, preDM who presents with worsening of intermittent left eye vision changes x 2 months. 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 Perineuritis can be idiopathic or associated with autoimmune disease - lower association with demyelinating disease such as MS. \par \par MRI brain/cervical spine/thoracic spine w/wo contrast negative for other inflammatory lesions or longitudinally extensive optic neuritis. CSF was bland with normal WBC and protein. CSF cytology negative. Serum NMO, MOG negative. CSF oligoclonal band negative,  MOG negative, CSF NMO negative. \par \par Serologic work-up for autoimmune disease negative (PEÑA, SSA, ANCA, RF). Inflammatory markers are elevated - LDH, ESR, CPR, ferritin. Inflammatory markers are up in setting of positive COVID infection. \par \par Perineuritis can be associated with IgG4 related disease, giant cell arteritis, Behcets diseae, HSV, herpes zoster, granulomatosis disease with polyangiitis, lupus, inflammatory bowel disease, tuberculosis, syphilis, HSV, herpes zoster viral encephalitis , primary or metastatic malignancy , Graves Disease. \par \par Following with hematology. Consideration of special send out testing for IgG4 if recurrence.\par \par Doing well. Vision stable. Repeat MRI orbit 10/2022 stable. Will repeat in 10/2023. After imaging, recommend continued  follow-up with neuro-ophtho and ophthalmology. Off steroids. Neuro exam stable. Discussed with her in detail if worsening of vision, she is to call office immediately. Will do stat MRI orbits. \par

## 2023-04-25 NOTE — DATA REVIEWED
[de-identified] : 10/2022: MRI orbits: near complete resolution of previously seen enhancement of left optic nerve. No abnormal signal change. \par 9/2022 \par MRI Brain/Orbits: \par FINDINGS: \par MRI brain:\par Diffusion imaging is negative for recent infarct. Susceptibility images show no parenchymal blood product deposition. There are preserved large vessel flow-voids. Ventricles, sulci and cisterns are normal in size and configuration. T2-FLAIR imaging is unremarkable aside from scattered minute foci of subcortical white matter signal which is nonspecific but statistically most likely manifestation of minor small vessel ischemia, essentially unremarkable and not unexpected for age. There is no mass or fluid collection. There is no abnormal enhancement within the brain parenchyma or elsewhere in the intracranial compartment.\par MRI orbits:\par On the T2-weighted images, no pathologic signal or gross enlargement is identified of either optic nerve. However, on postcontrast images, there is considerable perineural enhancement at the mid and posterior segments of the optic nerve, for example seen is a rind of enhancing tissue surrounding the nerve on series 25, image 28 and with linear extent along the nerve seen on axial series 23, image 14. Caliber of the nerve is similar to the right without neural compression. Enhancement is thin and not nodular, and no calcification is seen on recent CT to suggest meningioma. There is corresponding hazy and subtle STIR signal abnormality at this site on series 20 within and intraconal location. No fluid collection. Enhancement appears to terminate at the optic canal, without pathologic enhancement seen of the intracranial optic nerve, nor is there gross signal abnormality or mass effect on the optic chiasm. The right orbits shows no abnormal enhancement. Globes appear symmetrically intact, and there is no gross abnormality of the extraocular muscles or lacrimal glands.\par IMPRESSION: \par On orbit MRI, there is ill-defined enhancement surrounding the left optic nerve at its mid and posterior orbital segments, without gross intraneural enhancement. 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 Sarcoidosis, IgG4 disease, SLE, IBD may manifest with orbital inflammation. No mass lesion or compression of the nerve. Correlate for a response to steroids.\par Brain MRI is essentially unremarkable. No intracranial mass.\par \par MRI C-Spine\par FINDINGS: \par Bones/joints: Straightened cervical curvature with normal vertebral body \par heights and alignments. \par Spinal cord: Normal signal. No cord compression. \par Discs/Spinal canal/Neural foramina: C4-C7 small disc bulges cause up to mild \par stenosis. \par Vasculature: Expected flow voids in the vertebral arteries.  \par Soft tissues: Unremarkable \par IMPRESSION: \par Mild cervical spondylosis. No acute abnormalities.\par \par MRI T spine \par \par \par FINDINGS: \par Bones/joints: Unremarkable. \par Spinal cord: Normal signal. No cord compression. \par Discs/Spinal canal/Neural foramina:  No significant disc disease. No \par significant spinal canal stenosis. \par Soft tissues: Unremarkable. \par IMPRESSION: \par Unremarkable spine.\par \par CTA head/neck \par FINDINGS: \par Moderate bilateral cavernous carotid artery calcifications without high-grade stenosis.\par ANTERIOR CIRCULATION: \par Right internal carotid artery: Unremarkable. Intracranial segment is patent with no significant stenosis. No aneurysm. \par Right middle cerebral artery: Unremarkable. No occlusion or significant stenosis. No aneurysm.  \par Right anterior cerebral artery: Unremarkable. No occlusion or significant stenosis. No aneurysm.  \par \par Left internal carotid artery: Unremarkable. Intracranial segment is patent with no significant stenosis. No aneurysm. \par Left middle cerebral artery: Unremarkable. No occlusion or significant stenosis. No aneurysm.  \par Left anterior cerebral artery: Unremarkable. No occlusion or significant stenosis. No aneurysm.  \par \par POSTERIOR CIRCULATION: \par Right vertebral artery: Unremarkable. No occlusion or significant stenosis. No aneurysm.  \par Left vertebral artery: Unremarkable. No occlusion or significant stenosis. No aneurysm.  \par Basilar artery: Unremarkable. No occlusion or significant stenosis. No aneurysm. \par Right posterior cerebral artery: Fetal origin of the right posterior cerebral artery is a common developmental variant and there is no occlusion or significant stenosis. No aneurysm. \par Left posterior cerebral artery: Unremarkable. No occlusion or significant stenosis. No aneurysm.  \par \par \par IMPRESSION: \par No large vessel stenosis or occlusion detected involving the major branches of the anterior or posterior intracranial circulation. \par \par \par =========================\par \par \par Exam: CTA Neck With Contrast \par Exam date and time: 8/12/2022 7:29 PM \par Age: 62 years old Clinical indication: Visual disturbance \par \par TECHNIQUE: Imaging protocol: Computed tomographic angiography of the neck with contrast. \par \par COMPARISON: CT HEAD 8/12/2022 7:26 PM \par \par FINDINGS: \par Right common carotid artery: No stenosis. No dissection or occlusion. \par Right internal carotid artery: Atherosclerotic calcifications are seen at the right carotid bifurcation and involving the proximal segment of the right internal carotid artery with no evidence of 50% or greater stenosis of the extracranial segment. No dissection or occlusion. \par Right external carotid artery: No occlusion or stenosis of the origin.  \par \par Left common carotid artery: No stenosis. No dissection or occlusion. \par Left internal carotid artery: Atherosclerotic calcifications are seen at the left carotid bifurcation with no evidence of 50% or greater stenosis of the extracranial segment. No dissection or occlusion. \par Left external carotid artery: No occlusion or stenosis of the origin.  \par \par Right vertebral artery: No stenosis. No dissection or occlusion. \par Left vertebral artery: No stenosis. No dissection or occlusion. \par \par Soft tissues: Normal. No significant soft tissue swelling. \par \par Bones/joints: No acute fracture. \par \par IMPRESSION: \par No evidence of 50% or greater stenosis involving the cervical segments of the \par right or left internal carotid arteries by NASCET criteria. \par \par  [de-identified] : 9/21/22; \par \par TSH 0.22, Free T4 0.96, Ferritin 446 high, ESR 37: high, CSF C-reactive protein 10.1, CSF  high \par Vitamin D: 29.7 \par \par Serum MOG antibody negative, serum NMO negative, serum IgG index: WNL \par Serum SSA, SSB negative,  Scleroderma antigody negative CSF MBP pening, CSF oligoclonal band pending CSF ACE pending, CSF lyme, CSF MOG pending, CSF HSV pending\par Serum RF negative, PEÑA negative, P-anca - negatie, Atypical anca indeterminate \par NMO serum antibody negative \par \par CSF WBC: 2--> 1, CSF protein: 36, CSF glucose 99 \par CSF gram statin, culture - no growth \par CSF Cytology: negative for malignancy \par \par 9/18/22:  COVID positive \par 9/22/22: COVID negative

## 2023-04-25 NOTE — PHYSICAL EXAM
[FreeTextEntry1] : Mental Status: AxOx3, speehc fluent \par CN: visual acuity, left eye nasal superior quadrantanopia\par III, IV, VI, PERRL, EOMI \par V sensation normal to light touch, pinprick\par VII normal squint vs resistance, normal smile, face symmetric \par VIII: normal hearing\par IX, X normal gag, symmetric palate, uvula raises midline \par XI normal shrug versus resistance and lateralization of head versus resistance \par XII tongue symmetric, normal strength, no tremor fasciculations \par Motor: full strength throughout \par Sensory: normal to LT \par Reflexes \par Brachioradialis 2+, biceps 3+, triceps 2+, patella 3+ and ankles 2+ - all symmetric\par Plantar flexor response bilaterally \par Coordination: no dysmetria on FNF \par Gait : normal balance and gait, no ataxic movements, able to toe /heel/tandem \par \par

## 2023-04-25 NOTE — CONSULT LETTER
[Dear  ___] : Dear  [unfilled], [Consult Letter:] : I had the pleasure of evaluating your patient, [unfilled]. [Please see my note below.] : Please see my note below. [Sincerely,] : Sincerely, [FreeTextEntry3] : Martín Marie MD\par [facility]

## 2023-04-25 NOTE — HISTORY OF PRESENT ILLNESS
[FreeTextEntry1] : Presenting for follow-up. Last seen in clinic on 22. Doing well. No longer taking prednisone. Had a prolonged taper - completed around 2022 as per my notes. No worsening of vision. 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 She barely notices it. Maybe when looking down. Drives. \par \par Follows with Dr. Farias and Dr. Smith for ophthalmology and neuro-ophthalmology. No pressure or gritty sensation behind eye. Had repeat MRI orbits 10/2022 which revealed near complete resolution of previously seen \par enhancement of left optic nerve. Followed with Dr. Valdez for hematologic work up in setting of optic perineuritis and elevated ferritin , ESR/CRP. Had MRI of liver - no finding of iron overload. \par \par Travelling to [place]. Nervous about recurrence - would like some prednisone if sudden worsening of vision while abroad. \par \par Current Medications \par aspirin 81 mg daily \par atenolol 25 mg daily \par atorvastatin 10 mg daily \par hydrochlorothiazide 25 mg daily \par omeprazole 40 mg daily\par pantoprazole 40 mg qd \par vitamin d 1000 units  \par vitamin b12 \par \par ________________________________________________\par 22 \par Enma is a 62-year-old right-handed woman with a past medical history of pre-diabetes, recently diagnosed left optic perineuritis and hypertension presenting for hospital follow-up. \par She was hospitalized at [facility] from -22. \par \par She reports that around , she didn't feel well. She tested positive for COVID. Symptoms were just a mild cold. No shortness of breath. The following week, she noticed that vision in her left eye was blurry. It lasted about a day or so. A few days later, she noticed a black spot in the left eye nasal visual field. She went to an ophthalmologist who didn't see papillitis/anything wrong and asked her to come to the emergency room. She came to the emergency room at [facility] on 22. She had a CTA head/neck which was normal. She was recommended to get a MRI orbits/brain as outpatient. She returned to [facility] ED on  reporting worsening vision in left eye with a black spot and discomfort. She was admitted and had an MRI of brain which showed appearance of possible left optic perineuritis. \par She was treated with 3 days of high dose solumedrol 1 g and reported improvement in vision and discomfort the next day. MRI cervical and thoracic spine without lesions. She had an IR guided LP which was bland. Oligoclonal bands, NMO, and MOG in CSF are still pending. CSF cytology and cultures were negative.\par Rheumatologic labs were unrevealing. Inflammatory markers including ESR, CRP, ferritin, LDH were up. She is presenting for follow-up. She is taking prednisone with taper by 10 mg as instructed. She is taking PPI and aspirin 81 mg (instructed because inflammatory markers are up). \par She has follow-up with primary care and with cardiologist coming up. 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 \par \par No side-effects from prednisone apart from insomnia. She is not drinking alcohol. \par \par No history of blood clots. No history of spontaneous miscarriage. Pap smears were up to date. Keeping up with mammograms. No history of cancer. \par Vaccinated against COVID x2 and has one booster shot. \par \par Past Medical History\par pre-diabetes\par hypertension\par \par Surgeries: \par \par \par Social History\par Used to drink wine, a couple glasses a day\par No cigarettes \par Retired, used to work as a dental hygenist \par Drives \par \par Medications:\par vitamin D 1000 units qd \par aspirin 81\par pantoprazole\par prednisone 60 mg qd \par metformin 500 mg qd\par hctz 25 mg qd \par atenolol 25 mg qd\par atorvastatin 10 mg qd\par dorzolamide? \par \par Primary Care Doctor:\par Dr. Lane \par Dr. Gaurang Smith - neurophthalmology\par \par Family History: \par Mother -  - 87 Alzheimers\par Father - - 67 esophageal cancer\par Brother- non pertinent medical history\par 3 healthy children \par \par Investigations: \par \par MRI Brain/Orbits: \par FINDINGS: \par MRI brain:\par Diffusion imaging is negative for recent infarct. Susceptibility images show no parenchymal blood product deposition. There are preserved large vessel flow-voids. Ventricles, sulci and cisterns are normal in size and configuration. T2-FLAIR imaging is unremarkable aside from scattered minute foci of subcortical white matter signal which is nonspecific but statistically most likely manifestation of minor small vessel ischemia, essentially unremarkable and not unexpected for age. There is no mass or fluid collection. There is no abnormal enhancement within the brain parenchyma or elsewhere in the intracranial compartment.\par MRI orbits:\par On the T2-weighted images, no pathologic signal or gross enlargement is identified of either optic nerve. However, on postcontrast images, there is considerable perineural enhancement at the mid and posterior segments of the optic nerve, for example seen is a rind of enhancing tissue surrounding the nerve on series 25, image 28 and with linear extent along the nerve seen on axial series 23, image 14. Caliber of the nerve is similar to the right without neural compression. Enhancement is thin and not nodular, and no calcification is seen on recent CT to suggest meningioma. There is corresponding hazy and subtle STIR signal abnormality at this site on series 20 within and intraconal location. No fluid collection. Enhancement appears to terminate at the optic canal, without pathologic enhancement seen of the intracranial optic nerve, nor is there gross signal abnormality or mass effect on the optic chiasm. The right orbits shows no abnormal enhancement. Globes appear symmetrically intact, and there is no gross abnormality of the extraocular muscles or lacrimal glands.\par IMPRESSION: \par On orbit MRI, there is ill-defined enhancement surrounding the left optic nerve at its mid and posterior orbital segments, without gross intraneural enhancement. 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 Sarcoidosis, IgG4 disease, SLE, IBD may manifest with orbital inflammation. No mass lesion or compression of the nerve. Correlate for a response to steroids.\par Brain MRI is essentially unremarkable. No intracranial mass.\par \par MRI C-Spine\par FINDINGS: \par Bones/joints: Straightened cervical curvature with normal vertebral body \par heights and alignments. \par Spinal cord: Normal signal. No cord compression. \par Discs/Spinal canal/Neural foramina: C4-C7 small disc bulges cause up to mild \par stenosis. \par Vasculature: Expected flow voids in the vertebral arteries.  \par Soft tissues: Unremarkable \par IMPRESSION: \par Mild cervical spondylosis. No acute abnormalities.\par \par MRI T spine \par \par \par FINDINGS: \par Bones/joints: Unremarkable. \par Spinal cord: Normal signal. No cord compression. \par Discs/Spinal canal/Neural foramina:  No significant disc disease. No \par significant spinal canal stenosis. \par Soft tissues: Unremarkable. \par IMPRESSION: \par Unremarkable spine.\par \par CTA head/neck \par FINDINGS: \par Moderate bilateral cavernous carotid artery calcifications without high-grade stenosis.\par ANTERIOR CIRCULATION: \par Right internal carotid artery: Unremarkable. Intracranial segment is patent with no significant stenosis. No aneurysm. \par Right middle cerebral artery: Unremarkable. No occlusion or significant stenosis. No aneurysm.  \par Right anterior cerebral artery: Unremarkable. No occlusion or significant stenosis. No aneurysm.  \par \par Left internal carotid artery: Unremarkable. Intracranial segment is patent with no significant stenosis. No aneurysm. \par Left middle cerebral artery: Unremarkable. No occlusion or significant stenosis. No aneurysm.  \par Left anterior cerebral artery: Unremarkable. No occlusion or significant stenosis. No aneurysm.  \par \par POSTERIOR CIRCULATION: \par Right vertebral artery: Unremarkable. No occlusion or significant stenosis. No aneurysm.  \par Left vertebral artery: Unremarkable. No occlusion or significant stenosis. No aneurysm.  \par Basilar artery: Unremarkable. No occlusion or significant stenosis. No aneurysm. \par Right posterior cerebral artery: Fetal origin of the right posterior cerebral artery is a common developmental variant and there is no occlusion or significant stenosis. No aneurysm. \par Left posterior cerebral artery: Unremarkable. No occlusion or significant stenosis. No aneurysm.  \par \par \par IMPRESSION: \par No large vessel stenosis or occlusion detected involving the major branches of the anterior or posterior intracranial circulation. \par \par \par =========================\par \par \par Exam: CTA Neck With Contrast \par Exam date and time: 2022 7:29 PM \par Age: 62 years old Clinical indication: Visual disturbance \par \par TECHNIQUE: Imaging protocol: Computed tomographic angiography of the neck with contrast. \par \par COMPARISON: CT HEAD 2022 7:26 PM \par \par FINDINGS: \par Right common carotid artery: No stenosis. No dissection or occlusion. \par Right internal carotid artery: Atherosclerotic calcifications are seen at the right carotid bifurcation and involving the proximal segment of the right internal carotid artery with no evidence of 50% or greater stenosis of the extracranial segment. No dissection or occlusion. \par Right external carotid artery: No occlusion or stenosis of the origin.  \par \par Left common carotid artery: No stenosis. No dissection or occlusion. \par Left internal carotid artery: Atherosclerotic calcifications are seen at the left carotid bifurcation with no evidence of 50% or greater stenosis of the extracranial segment. No dissection or occlusion. \par Left external carotid artery: No occlusion or stenosis of the origin.  \par \par Right vertebral artery: No stenosis. No dissection or occlusion. \par Left vertebral artery: No stenosis. No dissection or occlusion. \par \par Soft tissues: Normal. No significant soft tissue swelling. \par \par Bones/joints: No acute fracture. \par \par IMPRESSION: \par No evidence of 50% or greater stenosis involving the cervical segments of the \par right or left internal carotid arteries by NASCET criteria. \par \par \par 22; \par \par TSH 0.22, Free T4 0.96, Ferritin 446 high, ESR 37: high, CSF C-reactive protein 10.1, CSF  high \par Vitamin D: 29.7 \par \par Serum MOG antibody negative, serum NMO negative, serum IgG index: WNL \par Serum SSA, SSB negative,  Scerlodermia antigody negative CSF MBP pening, CSF oligoclonal band pending CSF ACE pending, CSF lyme, CSF MOG pending, CSF HSV pending\par Serum RF negative, PEÑA negative, P-anca - negatie, Atypical anca indeterminate \par NMO serum antibody negative \par \par CSF WBC: 2--> 1, CSF protein: 36, CSF glucose 99 \par CSF gram statin, culture - no growth \par CSF Cytology: negative for malignancy \par \par 22:  COVID positive \par 22: COVID negative\par

## 2023-05-01 ENCOUNTER — RESULT REVIEW (OUTPATIENT)
Age: 63
End: 2023-05-01

## 2023-05-01 ENCOUNTER — APPOINTMENT (OUTPATIENT)
Dept: HEMATOLOGY ONCOLOGY | Facility: CLINIC | Age: 63
End: 2023-05-01

## 2023-05-01 VITALS
WEIGHT: 170.06 LBS | BODY MASS INDEX: 31.3 KG/M2 | DIASTOLIC BLOOD PRESSURE: 72 MMHG | HEIGHT: 62 IN | SYSTOLIC BLOOD PRESSURE: 147 MMHG | HEART RATE: 95 BPM | TEMPERATURE: 97 F | OXYGEN SATURATION: 96 % | RESPIRATION RATE: 16 BRPM

## 2023-09-07 ENCOUNTER — APPOINTMENT (OUTPATIENT)
Dept: NEUROLOGY | Facility: CLINIC | Age: 63
End: 2023-09-07

## 2023-10-03 ENCOUNTER — RESULT REVIEW (OUTPATIENT)
Age: 63
End: 2023-10-03

## 2023-11-02 ENCOUNTER — RESULT REVIEW (OUTPATIENT)
Age: 63
End: 2023-11-02

## 2023-11-02 ENCOUNTER — APPOINTMENT (OUTPATIENT)
Dept: NEUROLOGY | Facility: CLINIC | Age: 63
End: 2023-11-02
Payer: COMMERCIAL

## 2023-11-02 DIAGNOSIS — G56.03 CARPAL TUNNEL SYNDROM,BILATERAL UPPER LIMBS: ICD-10-CM

## 2023-11-02 PROCEDURE — 95886 MUSC TEST DONE W/N TEST COMP: CPT

## 2023-11-02 PROCEDURE — 95912 NRV CNDJ TEST 11-12 STUDIES: CPT

## 2023-11-21 ENCOUNTER — NON-APPOINTMENT (OUTPATIENT)
Age: 63
End: 2023-11-21

## 2023-12-19 ENCOUNTER — NON-APPOINTMENT (OUTPATIENT)
Age: 63
End: 2023-12-19

## 2023-12-20 ENCOUNTER — APPOINTMENT (OUTPATIENT)
Dept: RHEUMATOLOGY | Facility: CLINIC | Age: 63
End: 2023-12-20
Payer: COMMERCIAL

## 2023-12-20 VITALS
HEART RATE: 89 BPM | SYSTOLIC BLOOD PRESSURE: 156 MMHG | BODY MASS INDEX: 31.28 KG/M2 | DIASTOLIC BLOOD PRESSURE: 100 MMHG | OXYGEN SATURATION: 97 % | HEIGHT: 62 IN | WEIGHT: 170 LBS

## 2023-12-20 PROCEDURE — 99204 OFFICE O/P NEW MOD 45 MIN: CPT

## 2023-12-20 NOTE — HISTORY OF PRESENT ILLNESS
[FreeTextEntry1] : 62yo F with optic neuritis in the office for evaluation  history: patient in 2022 presented with vision problems of the left eye workup with evidence optic neuritis started treatment with steroids, pulse dose, high dose oral followed by a taper extensive outpatient and in hospital evaluation recently this year with follow up MRI with new right eye findings on the optic nerve patient asymptomatic started on high dose taper of prednisone, today on 10 mg daily saw optho, vision filed testing no new changes patient feels well, no vision complains  ROS no fever no chills no cough/CP no skin rashes no dry eye, no dry mouth no skin rashes no malar rash

## 2024-01-03 ENCOUNTER — RESULT REVIEW (OUTPATIENT)
Age: 64
End: 2024-01-03

## 2024-01-03 ENCOUNTER — APPOINTMENT (OUTPATIENT)
Dept: RHEUMATOLOGY | Facility: CLINIC | Age: 64
End: 2024-01-03
Payer: COMMERCIAL

## 2024-01-03 PROCEDURE — 36415 COLL VENOUS BLD VENIPUNCTURE: CPT

## 2024-01-04 ENCOUNTER — RESULT REVIEW (OUTPATIENT)
Age: 64
End: 2024-01-04

## 2024-01-04 LAB
DEPRECATED KAPPA LC FREE/LAMBDA SER: 1.34 RATIO
IGA SER QL IEP: 305 MG/DL
IGG SER QL IEP: 580 MG/DL
IGG4 SER-MCNC: 24.7 MG/DL
IGM SER QL IEP: 106 MG/DL
KAPPA LC CSF-MCNC: 1.05 MG/DL
KAPPA LC SERPL-MCNC: 1.41 MG/DL

## 2024-01-17 ENCOUNTER — APPOINTMENT (OUTPATIENT)
Dept: NEUROLOGY | Facility: CLINIC | Age: 64
End: 2024-01-17
Payer: COMMERCIAL

## 2024-01-17 VITALS
HEIGHT: 62 IN | BODY MASS INDEX: 31.28 KG/M2 | OXYGEN SATURATION: 97 % | HEART RATE: 85 BPM | SYSTOLIC BLOOD PRESSURE: 139 MMHG | WEIGHT: 170 LBS | DIASTOLIC BLOOD PRESSURE: 85 MMHG

## 2024-01-17 PROCEDURE — 99215 OFFICE O/P EST HI 40 MIN: CPT

## 2024-01-17 RX ORDER — PREDNISONE 20 MG/1
20 TABLET ORAL DAILY
Qty: 10 | Refills: 0 | Status: DISCONTINUED | COMMUNITY
Start: 2023-04-25 | End: 2024-01-17

## 2024-01-17 RX ORDER — PREDNISONE 20 MG/1
20 TABLET ORAL DAILY
Qty: 21 | Refills: 0 | Status: DISCONTINUED | COMMUNITY
Start: 2023-11-13 | End: 2024-01-17

## 2024-01-17 RX ORDER — ATORVASTATIN CALCIUM 10 MG/1
10 TABLET, FILM COATED ORAL
Qty: 90 | Refills: 3 | Status: DISCONTINUED | COMMUNITY
Start: 2023-03-05 | End: 2024-01-17

## 2024-01-17 RX ORDER — ATORVASTATIN CALCIUM 10 MG/1
10 TABLET, FILM COATED ORAL
Qty: 30 | Refills: 3 | Status: DISCONTINUED | COMMUNITY
Start: 2022-08-26 | End: 2024-01-17

## 2024-01-18 ENCOUNTER — APPOINTMENT (OUTPATIENT)
Dept: HEMATOLOGY ONCOLOGY | Facility: CLINIC | Age: 64
End: 2024-01-18

## 2024-01-23 NOTE — DISCUSSION/SUMMARY
[FreeTextEntry1] : 64-year-old right-handed woman with a past medical history of HTN, HLD, preDM who presents with worsening of intermittent left eye vision changes x 2 months. 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 Perineuritis can be idiopathic or associated with autoimmune disease - lower association with demyelinating disease such as MS.  MRI brain/cervical spine/thoracic spine w/wo contrast negative for other inflammatory lesions or longitudinally extensive optic neuritis. CSF was bland with normal WBC and protein. CSF cytology negative. Serum NMO, MOG negative. CSF oligoclonal band negative, MOG negative, CSF NMO negative.  Serologic work-up for autoimmune disease negative (PEÑA, SSA, ANCA, RF). Inflammatory markers are elevated - LDH, ESR, CPR, ferritin. Inflammatory markers are up in setting of positive COVID infection.  Perineuritis can be associated with IgG4 related disease, giant cell arteritis, Behcets diseae, HSV, herpes zoster, granulomatosis disease with polyangiitis, lupus, inflammatory bowel disease, tuberculosis, syphilis, HSV, herpes zoster viral encephalitis , primary or metastatic malignancy , Graves Disease.  Following with hematology. Consideration of special send out testing for IgG4 if recurrence.  Doing well. Vision stable. Repeat MRI orbit 10/2022 stable.  Repeat imaging 11/2023 revealed right perineuritis. This was asymptomatic. Received high dose steroids. Repeat imaging with slight improvement and bilateral mild optic atrophy. Exhaustive work up by rheumatology/oncology without clear etiology.   Discussed with her in detail if worsening of vision, she is to call office immediately. Will do stat MRI orbits. For now plan will be for repeat imaging of orbits in six months. If recurrent enhancement, will need LP at that point, steroids, ophthalmology follow-up.

## 2024-01-23 NOTE — ASSESSMENT
[FreeTextEntry1] : We will do a repeat MRI orbits in six months Can schedule a follow up afterwards If there is a sudden change in vision , please call us immediately.

## 2024-01-23 NOTE — DATA REVIEWED
[de-identified] : 1/9/24 MRI orbits: minimal bilateral optic sheath enhancement. Mild bilateral optic nerve atrophy is unchanged.  1/9/24 : MRI cervical spine and thoracic spine without lesions.  10/2022: MRI orbits: near complete resolution of previously seen enhancement of left optic nerve. No abnormal signal change. 9/2022  MRI Brain/Orbits:  FINDINGS:  MRI brain: Diffusion imaging is negative for recent infarct. Susceptibility images show no parenchymal blood product deposition. There are preserved large vessel flow-voids. Ventricles, sulci and cisterns are normal in size and configuration. T2-FLAIR imaging is unremarkable aside from scattered minute foci of subcortical white matter signal which is nonspecific but statistically most likely manifestation of minor small vessel ischemia, essentially unremarkable and not unexpected for age. There is no mass or fluid collection. There is no abnormal enhancement within the brain parenchyma or elsewhere in the intracranial compartment. MRI orbits: On the T2-weighted images, no pathologic signal or gross enlargement is identified of either optic nerve. However, on postcontrast images, there is considerable perineural enhancement at the mid and posterior segments of the optic nerve, for example seen is a rind of enhancing tissue surrounding the nerve on series 25, image 28 and with linear extent along the nerve seen on axial series 23, image 14. Caliber of the nerve is similar to the right without neural compression. Enhancement is thin and not nodular, and no calcification is seen on recent CT to suggest meningioma. There is corresponding hazy and subtle STIR signal abnormality at this site on series 20 within and intraconal location. No fluid collection. Enhancement appears to terminate at the optic canal, without pathologic enhancement seen of the intracranial optic nerve, nor is there gross signal abnormality or mass effect on the optic chiasm. The right orbits shows no abnormal enhancement. Globes appear symmetrically intact, and there is no gross abnormality of the extraocular muscles or lacrimal glands. IMPRESSION:  On orbit MRI, there is ill-defined enhancement surrounding the left optic nerve at its mid and posterior orbital segments, without gross intraneural enhancement. 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 Sarcoidosis, IgG4 disease, SLE, IBD may manifest with orbital inflammation. No mass lesion or compression of the nerve. Correlate for a response to steroids. Brain MRI is essentially unremarkable. No intracranial mass.  MRI C-Spine FINDINGS:  Bones/joints: Straightened cervical curvature with normal vertebral body  heights and alignments.  Spinal cord: Normal signal. No cord compression.  Discs/Spinal canal/Neural foramina: C4-C7 small disc bulges cause up to mild  stenosis.  Vasculature: Expected flow voids in the vertebral arteries.   Soft tissues: Unremarkable  IMPRESSION:  Mild cervical spondylosis. No acute abnormalities.  MRI T spine    FINDINGS:  Bones/joints: Unremarkable.  Spinal cord: Normal signal. No cord compression.  Discs/Spinal canal/Neural foramina:  No significant disc disease. No  significant spinal canal stenosis.  Soft tissues: Unremarkable.  IMPRESSION:  Unremarkable spine.  CTA head/neck  FINDINGS:  Moderate bilateral cavernous carotid artery calcifications without high-grade stenosis. ANTERIOR CIRCULATION:  Right internal carotid artery: Unremarkable. Intracranial segment is patent with no significant stenosis. No aneurysm.  Right middle cerebral artery: Unremarkable. No occlusion or significant stenosis. No aneurysm.   Right anterior cerebral artery: Unremarkable. No occlusion or significant stenosis. No aneurysm.    Left internal carotid artery: Unremarkable. Intracranial segment is patent with no significant stenosis. No aneurysm.  Left middle cerebral artery: Unremarkable. No occlusion or significant stenosis. No aneurysm.   Left anterior cerebral artery: Unremarkable. No occlusion or significant stenosis. No aneurysm.    POSTERIOR CIRCULATION:  Right vertebral artery: Unremarkable. No occlusion or significant stenosis. No aneurysm.   Left vertebral artery: Unremarkable. No occlusion or significant stenosis. No aneurysm.   Basilar artery: Unremarkable. No occlusion or significant stenosis. No aneurysm.  Right posterior cerebral artery: Fetal origin of the right posterior cerebral artery is a common developmental variant and there is no occlusion or significant stenosis. No aneurysm.  Left posterior cerebral artery: Unremarkable. No occlusion or significant stenosis. No aneurysm.     IMPRESSION:  No large vessel stenosis or occlusion detected involving the major branches of the anterior or posterior intracranial circulation.    =========================   Exam: CTA Neck With Contrast  Exam date and time: 8/12/2022 7:29 PM  Age: 62 years old Clinical indication: Visual disturbance   TECHNIQUE: Imaging protocol: Computed tomographic angiography of the neck with contrast.   COMPARISON: CT HEAD 8/12/2022 7:26 PM   FINDINGS:  Right common carotid artery: No stenosis. No dissection or occlusion.  Right internal carotid artery: Atherosclerotic calcifications are seen at the right carotid bifurcation and involving the proximal segment of the right internal carotid artery with no evidence of 50% or greater stenosis of the extracranial segment. No dissection or occlusion.  Right external carotid artery: No occlusion or stenosis of the origin.    Left common carotid artery: No stenosis. No dissection or occlusion.  Left internal carotid artery: Atherosclerotic calcifications are seen at the left carotid bifurcation with no evidence of 50% or greater stenosis of the extracranial segment. No dissection or occlusion.  Left external carotid artery: No occlusion or stenosis of the origin.    Right vertebral artery: No stenosis. No dissection or occlusion.  Left vertebral artery: No stenosis. No dissection or occlusion.   Soft tissues: Normal. No significant soft tissue swelling.   Bones/joints: No acute fracture.   IMPRESSION:  No evidence of 50% or greater stenosis involving the cervical segments of the  right or left internal carotid arteries by NASCET criteria.    [de-identified] : 9/21/22; \par  \par  TSH 0.22, Free T4 0.96, Ferritin 446 high, ESR 37: high, CSF C-reactive protein 10.1, CSF  high \par  Vitamin D: 29.7 \par  \par  Serum MOG antibody negative, serum NMO negative, serum IgG index: WNL \par  Serum SSA, SSB negative,  Scleroderma antigody negative CSF MBP pening, CSF oligoclonal band pending CSF ACE pending, CSF lyme, CSF MOG pending, CSF HSV pending\par  Serum RF negative, PEÑA negative, P-anca - negatie, Atypical anca indeterminate \par  NMO serum antibody negative \par  \par  CSF WBC: 2--> 1, CSF protein: 36, CSF glucose 99 \par  CSF gram statin, culture - no growth \par  CSF Cytology: negative for malignancy \par  \par  9/18/22:  COVID positive \par  9/22/22: COVID negative

## 2024-01-23 NOTE — HISTORY OF PRESENT ILLNESS
[FreeTextEntry1] : Last seen in clinic on 23. Finished course of steroids (full dose was given as PO dose) followed by taper. Doing well. Didn't notice any changes in right eye. No pain behind right eye. This was an incidental finding.  Exhausted work up by rheumatology without evidence of systemic findings. Has to follow-up with hematology. Prescription for eye glasses has not changed. No worsening of vision. Does have field cut in left eye (small). Otherwise doing well.  Off steroids completely.   _________________________________ 23  Presenting for follow-up. Last seen in clinic on 22. Doing well. No longer taking prednisone. Had a prolonged taper of prednisone. Taper completed around 2022 as per my notes. No worsening of vision. 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 She barely notices it. Maybe when looking down. Follows with Dr. Farias and Dr. Smith for ophthalmology and neuro-ophthalmology. No pressure or gritty sensation behind eye. Had repeat MRI orbits 10/2022 which revealed near complete resolution of previously seen  enhancement of left optic nerve. Followed with Dr. Valdez for hematologic work up in setting of optic perineuritis and elevated ferritin , ESR. Had MRI of liver - no finding of iron overload.   Travelling to [facility]. Nervous about recurrence - would like some prednisone if sudden worsening of vision while abroad.   Current Medications  aspirin 81 mg daily  atenolol 25 mg daily  atorvastatin 10 mg daily  hydrochlorothiazide 25 mg daily  omeprazole 40 mg daily pantoprazole 40 mg qd  vitamin d 1000 units   vitamin b12   ________________________________________________ 22  Enma is a 62-year-old right-handed woman with a past medical history of pre-diabetes, recently diagnosed left optic perineuritis and hypertension presenting for hospital follow-up.  She was hospitalized at [facility] from -22.   She reports that around , she didn't feel well. She tested positive for COVID. Symptoms were just a mild cold. No shortness of breath. The following week, she noticed that vision in her left eye was blurry. It lasted about a day or so. A few days later, she noticed a black spot in the left eye nasal visual field. She went to an ophthalmologist who didn't see papillitis/anything wrong and asked her to come to the emergency room. She came to the emergency room at [facility] on 22. She had a CTA head/neck which was normal. She was recommended to get a MRI orbits/brain as outpatient. She returned to [facility] ED on  reporting worsening vision in left eye with a black spot and discomfort. She was admitted and had an MRI of brain which showed appearance of possible left optic perineuritis.  She was treated with 3 days of high dose solumedrol 1 g and reported improvement in vision and discomfort the next day. MRI cervical and thoracic spine without lesions. She had an IR guided LP which was bland. Oligoclonal bands, NMO, and MOG in CSF are still pending. CSF cytology and cultures were negative. Rheumatologic labs were unrevealing. Inflammatory markers including ESR, CRP, ferritin, LDH were up. She is presenting for follow-up. She is taking prednisone with taper by 10 mg as instructed. She is taking PPI and aspirin 81 mg (instructed because inflammatory markers are up).  She has follow-up with primary care and with cardiologist coming up.   She still has small left eye nasal superior quadrantanopia.   No side-effects from prednisone apart from insomnia. She is not drinking alcohol.   No history of blood clots. No history of spontaneous miscarriage. Pap smears were up to date. Keeping up with mammograms. No history of cancer.  Vaccinated against COVID x2 and has one booster shot.   Past Medical History pre-diabetes hypertension  Surgeries:    Social History Used to drink wine, a couple glasses a day No cigarettes  Retired, used to work as a dental hygenist  Drives   Medications: vitamin D 1000 units qd  aspirin 81 pantoprazole prednisone 60 mg qd  metformin 500 mg qd hctz 25 mg qd  atenolol 25 mg qd atorvastatin 10 mg qd dorzolamide?   Primary Care Doctor: Dr. Domingo Smith - neurophthalmology  Family History:  Mother -  - 87 Alzheimers Father - - 67 esophageal cancer Brother- non pertinent medical history 3 healthy children   Investigations:   MRI Brain/Orbits:  FINDINGS:  MRI brain: Diffusion imaging is negative for recent infarct. Susceptibility images show no parenchymal blood product deposition. There are preserved large vessel flow-voids. Ventricles, sulci and cisterns are normal in size and configuration. T2-FLAIR imaging is unremarkable aside from scattered minute foci of subcortical white matter signal which is nonspecific but statistically most likely manifestation of minor small vessel ischemia, essentially unremarkable and not unexpected for age. There is no mass or fluid collection. There is no abnormal enhancement within the brain parenchyma or elsewhere in the intracranial compartment. MRI orbits: On the T2-weighted images, no pathologic signal or gross enlargement is identified of either optic nerve. However, on postcontrast images, there is considerable perineural enhancement at the mid and posterior segments of the optic nerve, for example seen is a rind of enhancing tissue surrounding the nerve on series 25, image 28 and with linear extent along the nerve seen on axial series 23, image 14. Caliber of the nerve is similar to the right without neural compression. Enhancement is thin and not nodular, and no calcification is seen on recent CT to suggest meningioma. There is corresponding hazy and subtle STIR signal abnormality at this site on series 20 within and intraconal location. No fluid collection. Enhancement appears to terminate at the optic canal, without pathologic enhancement seen of the intracranial optic nerve, nor is there gross signal abnormality or mass effect on the optic chiasm. The right orbits shows no abnormal enhancement. Globes appear symmetrically intact, and there is no gross abnormality of the extraocular muscles or lacrimal glands. IMPRESSION:  On orbit MRI, there is ill-defined enhancement surrounding the left optic nerve at its mid and posterior orbital segments, without gross intraneural enhancement. 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 Sarcoidosis, IgG4 disease, SLE, IBD may manifest with orbital inflammation. No mass lesion or compression of the nerve. Correlate for a response to steroids. Brain MRI is essentially unremarkable. No intracranial mass.  MRI C-Spine FINDINGS:  Bones/joints: Straightened cervical curvature with normal vertebral body  heights and alignments.  Spinal cord: Normal signal. No cord compression.  Discs/Spinal canal/Neural foramina: C4-C7 small disc bulges cause up to mild  stenosis.  Vasculature: Expected flow voids in the vertebral arteries.   Soft tissues: Unremarkable  IMPRESSION:  Mild cervical spondylosis. No acute abnormalities.  MRI T spine    FINDINGS:  Bones/joints: Unremarkable.  Spinal cord: Normal signal. No cord compression.  Discs/Spinal canal/Neural foramina:  No significant disc disease. No  significant spinal canal stenosis.  Soft tissues: Unremarkable.  IMPRESSION:  Unremarkable spine.  CTA head/neck  FINDINGS:  Moderate bilateral cavernous carotid artery calcifications without high-grade stenosis. ANTERIOR CIRCULATION:  Right internal carotid artery: Unremarkable. Intracranial segment is patent with no significant stenosis. No aneurysm.  Right middle cerebral artery: Unremarkable. No occlusion or significant stenosis. No aneurysm.   Right anterior cerebral artery: Unremarkable. No occlusion or significant stenosis. No aneurysm.    Left internal carotid artery: Unremarkable. Intracranial segment is patent with no significant stenosis. No aneurysm.  Left middle cerebral artery: Unremarkable. No occlusion or significant stenosis. No aneurysm.   Left anterior cerebral artery: Unremarkable. No occlusion or significant stenosis. No aneurysm.    POSTERIOR CIRCULATION:  Right vertebral artery: Unremarkable. No occlusion or significant stenosis. No aneurysm.   Left vertebral artery: Unremarkable. No occlusion or significant stenosis. No aneurysm.   Basilar artery: Unremarkable. No occlusion or significant stenosis. No aneurysm.  Right posterior cerebral artery: Fetal origin of the right posterior cerebral artery is a common developmental variant and there is no occlusion or significant stenosis. No aneurysm.  Left posterior cerebral artery: Unremarkable. No occlusion or significant stenosis. No aneurysm.     IMPRESSION:  No large vessel stenosis or occlusion detected involving the major branches of the anterior or posterior intracranial circulation.    =========================   Exam: CTA Neck With Contrast  Exam date and time: 2022 7:29 PM  Age: 62 years old Clinical indication: Visual disturbance   TECHNIQUE: Imaging protocol: Computed tomographic angiography of the neck with contrast.   COMPARISON: CT HEAD 2022 7:26 PM   FINDINGS:  Right common carotid artery: No stenosis. No dissection or occlusion.  Right internal carotid artery: Atherosclerotic calcifications are seen at the right carotid bifurcation and involving the proximal segment of the right internal carotid artery with no evidence of 50% or greater stenosis of the extracranial segment. No dissection or occlusion.  Right external carotid artery: No occlusion or stenosis of the origin.    Left common carotid artery: No stenosis. No dissection or occlusion.  Left internal carotid artery: Atherosclerotic calcifications are seen at the left carotid bifurcation with no evidence of 50% or greater stenosis of the extracranial segment. No dissection or occlusion.  Left external carotid artery: No occlusion or stenosis of the origin.    Right vertebral artery: No stenosis. No dissection or occlusion.  Left vertebral artery: No stenosis. No dissection or occlusion.   Soft tissues: Normal. No significant soft tissue swelling.   Bones/joints: No acute fracture.   IMPRESSION:  No evidence of 50% or greater stenosis involving the cervical segments of the  right or left internal carotid arteries by NASCET criteria.    22;   TSH 0.22, Free T4 0.96, Ferritin 446 high, ESR 37: high, CSF C-reactive protein 10.1, CSF  high  Vitamin D: 29.7   Serum MOG antibody negative, serum NMO negative, serum IgG index: WNL  Serum SSA, SSB negative,  Scerlodermia antigody negative CSF MBP pening, CSF oligoclonal band pending CSF ACE pending, CSF lyme, CSF MOG pending, CSF HSV pending Serum RF negative, PEÑA negative, P-anca - negatie, Atypical anca indeterminate  NMO serum antibody negative   CSF WBC: 2--> 1, CSF protein: 36, CSF glucose 99  CSF gram statin, culture - no growth  CSF Cytology: negative for malignancy   22:  COVID positive  22: COVID negative

## 2024-01-23 NOTE — PHYSICAL EXAM
[FreeTextEntry1] : Mental Status: AxOx3, speech fluent  CN: visual acuity, left eye nasal superior quadrantanopia III, IV, VI, PERRL, EOMI  V sensation normal to light touch, pinprick VII normal squint vs resistance, normal smile, face symmetric  VIII: normal hearing IX, X normal gag, symmetric palate, uvula raises midline  XI normal shrug versus resistance and lateralization of head versus resistance  XII tongue symmetric, normal strength, no tremor fasciculations  Motor: full strength throughout  Sensory: normal to LT  Coordination: no dysmetria on FNF  Gait : normal balance and gait,

## 2024-02-13 ENCOUNTER — APPOINTMENT (OUTPATIENT)
Dept: CARDIOLOGY | Facility: CLINIC | Age: 64
End: 2024-02-13

## 2024-03-04 ENCOUNTER — APPOINTMENT (OUTPATIENT)
Dept: HEMATOLOGY ONCOLOGY | Facility: CLINIC | Age: 64
End: 2024-03-04
Payer: COMMERCIAL

## 2024-03-04 ENCOUNTER — RESULT REVIEW (OUTPATIENT)
Age: 64
End: 2024-03-04

## 2024-03-04 VITALS
BODY MASS INDEX: 32.2 KG/M2 | RESPIRATION RATE: 16 BRPM | WEIGHT: 175 LBS | TEMPERATURE: 97.5 F | OXYGEN SATURATION: 98 % | HEIGHT: 62 IN | DIASTOLIC BLOOD PRESSURE: 71 MMHG | SYSTOLIC BLOOD PRESSURE: 142 MMHG | HEART RATE: 75 BPM

## 2024-03-04 DIAGNOSIS — Z14.8 GENETIC CARRIER OF OTHER DISEASE: ICD-10-CM

## 2024-03-04 DIAGNOSIS — H46.9 UNSPECIFIED OPTIC NEURITIS: ICD-10-CM

## 2024-03-04 DIAGNOSIS — H54.62 UNQUALIFIED VISUAL LOSS, LEFT EYE, NORMAL VISION RIGHT EYE: ICD-10-CM

## 2024-03-04 PROCEDURE — 99213 OFFICE O/P EST LOW 20 MIN: CPT

## 2024-03-04 RX ORDER — CLOBETASOL PROPIONATE 0.5 MG/G
0.05 CREAM TOPICAL
Qty: 60 | Refills: 0 | Status: COMPLETED | COMMUNITY
Start: 2022-06-14 | End: 2024-03-04

## 2024-03-08 PROBLEM — H46.9: Status: ACTIVE | Noted: 2022-09-26

## 2024-03-08 PROBLEM — Z14.8 HEMOCHROMATOSIS CARRIER: Status: ACTIVE | Noted: 2022-11-07

## 2024-03-08 PROBLEM — H54.62 VISION LOSS OF LEFT EYE: Status: ACTIVE | Noted: 2022-08-18

## 2024-03-08 NOTE — ASSESSMENT
[FreeTextEntry1] : # vision loss L eye unsure if it was an eye stroke vs other cause of vision loss no thrombosis  continue work up with optho and neurology factor V leiden - negative prothrombin gene mutation  - negative LA  - negative b2 glycoprotein  - negative cardiolipins - negative phosphatidyl serine ab - negative protein c activity elevated -  deficiency puts one at risk from thrombosis. monitor protein s ag high and activity normal - only concerned for thrombosis if deficient. monitor antithrombin III ag slightly elevated - deficiency puts one at risk from thrombosis. monitor 3/4/24 - vs and labs reviewed. labs drawn in office today. wbc, hgb and platelets wnl. liver enzymes slightly elevated. avoid hepatotoxic medications. crp and irons wnl. previous work up by Dr. Rockwell reviewed - IgG 580, IgG4 wnl  #elevated ESR/Ferritin and leukocytosis likely due to inflammation and steroids No evidence of CALR, MPL, JAK2 BCR/ABL qualitative - Negative iron wnl ferritin was down trending at last visit. will recheck  # hemochromatosis - H63D carrier MRI liver  with no iron deposition  Advised to limit alcohol intake. TSH wnl  Check DEXA scan to assess for osteoporosis Ferritin in this case is likely elevated as an acute phase reactant. If iron deposition or ferritin increases will recommend phlebotomy   RTC in 12 months with cbc with diff, cmp, iron, ferritin, ESR/CRP, LDH, Hapto, retic,

## 2024-03-13 ENCOUNTER — APPOINTMENT (OUTPATIENT)
Dept: INTERNAL MEDICINE | Facility: CLINIC | Age: 64
End: 2024-03-13

## 2024-03-25 ENCOUNTER — NON-APPOINTMENT (OUTPATIENT)
Age: 64
End: 2024-03-25

## 2024-03-25 ENCOUNTER — APPOINTMENT (OUTPATIENT)
Dept: CARDIOLOGY | Facility: CLINIC | Age: 64
End: 2024-03-25
Payer: COMMERCIAL

## 2024-03-25 VITALS
HEART RATE: 73 BPM | WEIGHT: 175 LBS | DIASTOLIC BLOOD PRESSURE: 69 MMHG | SYSTOLIC BLOOD PRESSURE: 145 MMHG | BODY MASS INDEX: 32.01 KG/M2 | OXYGEN SATURATION: 97 %

## 2024-03-25 DIAGNOSIS — E66.3 OVERWEIGHT: ICD-10-CM

## 2024-03-25 DIAGNOSIS — E78.5 HYPERLIPIDEMIA, UNSPECIFIED: ICD-10-CM

## 2024-03-25 DIAGNOSIS — U07.1 COVID-19: ICD-10-CM

## 2024-03-25 DIAGNOSIS — Z86.69 PERSONAL HISTORY OF OTHER DISEASES OF THE NERVOUS SYSTEM AND SENSE ORGANS: ICD-10-CM

## 2024-03-25 DIAGNOSIS — I10 ESSENTIAL (PRIMARY) HYPERTENSION: ICD-10-CM

## 2024-03-25 DIAGNOSIS — I38 ENDOCARDITIS, VALVE UNSPECIFIED: ICD-10-CM

## 2024-03-25 DIAGNOSIS — H54.7 UNSPECIFIED VISUAL LOSS: ICD-10-CM

## 2024-03-25 PROCEDURE — 99213 OFFICE O/P EST LOW 20 MIN: CPT

## 2024-03-25 PROCEDURE — 93000 ELECTROCARDIOGRAM COMPLETE: CPT

## 2024-03-27 PROBLEM — Z86.69 HISTORY OF CARPAL TUNNEL SYNDROME: Status: RESOLVED | Noted: 2024-03-27 | Resolved: 2024-03-27

## 2024-03-27 PROBLEM — H54.7 VISUAL LOSS: Status: RESOLVED | Noted: 2022-08-21 | Resolved: 2024-03-27

## 2024-03-27 PROBLEM — U07.1 COVID-19 VIRUS INFECTION: Status: RESOLVED | Noted: 2024-03-27 | Resolved: 2024-03-27

## 2024-03-27 RX ORDER — ATORVASTATIN CALCIUM 80 MG/1
TABLET, FILM COATED ORAL
Refills: 0 | Status: ACTIVE | COMMUNITY

## 2024-04-01 PROBLEM — I10 HYPERTENSION: Status: ACTIVE | Noted: 2022-08-27

## 2024-04-01 PROBLEM — I38 VALVULAR HEART DISEASE: Status: ACTIVE | Noted: 2022-08-27

## 2024-04-01 PROBLEM — E66.3 OVERWEIGHT: Status: ACTIVE | Noted: 2024-04-01

## 2024-04-01 PROBLEM — E78.5 HYPERLIPIDEMIA: Status: ACTIVE | Noted: 2022-08-27

## 2024-04-01 RX ORDER — TURMERIC ROOT EXTRACT 500 MG
TABLET ORAL
Refills: 0 | Status: ACTIVE | COMMUNITY

## 2024-04-01 RX ORDER — PSYLLIUM HUSK 0.4 G
CAPSULE ORAL
Refills: 0 | Status: ACTIVE | COMMUNITY

## 2024-04-01 RX ORDER — B-COMPLEX WITH VITAMIN C
TABLET ORAL
Refills: 0 | Status: ACTIVE | COMMUNITY

## 2024-04-01 NOTE — HISTORY OF PRESENT ILLNESS
[FreeTextEntry1] : 64-year-old female Routine follow-up for hypertension hyperlipidemia valvular heart disease/mitral regurgitation.  "I feel okay."  Olya denies chest pain shortness of breath or syncope.

## 2024-04-01 NOTE — DISCUSSION/SUMMARY
[FreeTextEntry1] : Hyperlipidemia Hyperlipidemia represents a risk factor for atherosclerotic heart disease. The target LDL level for primary prevention is about 100. Despite diet exercise and  weight loss lipid levels have been elevated.  In 3/22 the serum cholesterol level was 239 HDL 60 triglycerides 136 and . Atorvastatin 10 mg/day was prescribed in 3/22. Nonpharmacological therapy, specifically diet exercise and weight loss are  emphasized   as major aspects of treatment.  I have recommended the following a. Low fat low cholesterol  diabetic  diet. Regular aerobic exercise and weight loss b. Target LDL  level to  about 100 as discussed above c.   Laboratory studies including lipid profile liver function test etc. through primary care physician.    Hypertension Hypertension is reportedly  controlled on the present medical regimen.Elevation of blood pressure on initial examination today (145/69 mmHg) is attributed to a whitecoat effect In the setting of risk factors for atherosclerotic heart disease and prediabetes  ACE-I/ARB therapy is attractive.  I have recommended  the following: a. Low-fat low-cholesterol diabetic diet. Regular aerobic exercise weight loss b. Continue the present medical regimen c. Addition of ACE-I./ARB therapy if required to maintain optimal levels d. Home blood pressure monitoring       Valvular heart disease The 10/22 echocardiogram revealed mild-moderate mitral and mild tricuspid regurgitation. Mild aortic valve sclerosis was reported.  The left ventricular ejection fraction was 56%.  The present cardiac physical lamination is not suggestive of hemodynamically significant valvular pathology.  I have  recommended  the following a. No further cardiac testing for this problem    Overweight Despite significant recent weight loss  Enma  remains overweight. Today Mrs. Caldwell is 5 feet 2 inches tall and weighs 175pounds. She has gained 12 pounds in the last 2 years   Diet exercise and weight loss are advised.    The diagnosis, prognosis, risks, options and alternatives were explained at length to the patient. All questions were answered. Issues discussed included atherosclerotic heart disease hyperlipidemia hypertension  noninvasive cardiac testing diet and exercise.  Counseling and/or coordination of care Time was a significant factor for this patient encounter.  Total time spent with the patient was 25 minutes.  Greater than 50% of the time was devoted to counseling and/or coordination of care

## 2024-04-01 NOTE — REVIEW OF SYSTEMS
[Feeling Fatigued] : feeling fatigued [Joint Pain] : joint pain [Negative] : Heme/Lymph [FreeTextEntry3] : see history of present illness [FreeTextEntry5] : see history of present illness [de-identified] : see  history of present illness

## 2024-04-01 NOTE — PHYSICAL EXAM
[Normal Conjunctiva] : normal conjunctiva [Clear Lung Fields] : clear lung fields [Normal S1, S2] : normal S1, S2 [Non Tender] : non-tender [Soft] : abdomen soft [Normal Bowel Sounds] : normal bowel sounds [Normal Gait] : normal gait [de-identified] : Appears in no distress lying flat [de-identified] : No murmur. No gallop. No diastolic sounds. [de-identified] : No carotid bruit. No jugular venous distention [de-identified] : full range of motion [de-identified] : dorsalis pedis pulses +2 bilaterally. Feet warm and well-perfused. No ulcerations. No peripheral edema

## 2024-05-27 ENCOUNTER — RX RENEWAL (OUTPATIENT)
Age: 64
End: 2024-05-27

## 2024-05-27 RX ORDER — ATORVASTATIN CALCIUM 10 MG/1
10 TABLET, FILM COATED ORAL DAILY
Qty: 90 | Refills: 3 | Status: ACTIVE | COMMUNITY
Start: 2023-05-16 | End: 1900-01-01

## 2024-06-06 ENCOUNTER — APPOINTMENT (OUTPATIENT)
Dept: GASTROENTEROLOGY | Facility: CLINIC | Age: 64
End: 2024-06-06
Payer: COMMERCIAL

## 2024-06-06 VITALS
WEIGHT: 172 LBS | DIASTOLIC BLOOD PRESSURE: 78 MMHG | HEIGHT: 62 IN | OXYGEN SATURATION: 93 % | SYSTOLIC BLOOD PRESSURE: 140 MMHG | HEART RATE: 94 BPM | BODY MASS INDEX: 31.65 KG/M2

## 2024-06-06 DIAGNOSIS — Z12.11 ENCOUNTER FOR SCREENING FOR MALIGNANT NEOPLASM OF COLON: ICD-10-CM

## 2024-06-06 DIAGNOSIS — K21.9 GASTRO-ESOPHAGEAL REFLUX DISEASE W/OUT ESOPHAGITIS: ICD-10-CM

## 2024-06-06 PROCEDURE — G2211 COMPLEX E/M VISIT ADD ON: CPT | Mod: NC,1L

## 2024-06-06 PROCEDURE — 99205 OFFICE O/P NEW HI 60 MIN: CPT

## 2024-06-06 RX ORDER — METFORMIN HYDROCHLORIDE 625 MG/1
TABLET ORAL
Refills: 0 | Status: ACTIVE | COMMUNITY

## 2024-06-06 NOTE — PHYSICAL EXAM
[Alert] : alert [Sclera] : the sclera and conjunctiva were normal [Normal Appearance] : the appearance of the neck was normal [No Respiratory Distress] : no respiratory distress [None] : no edema [Abdomen Soft] : soft [No CVA Tenderness] : no CVA  tenderness [Abnormal Walk] : normal gait [Normal Color / Pigmentation] : normal skin color and pigmentation [Cranial Nerves Intact] : cranial nerves 2-12 were intact [Oriented To Time, Place, And Person] : oriented to person, place, and time [de-identified] : Deferred pending colonoscopy

## 2024-06-06 NOTE — HISTORY OF PRESENT ILLNESS
[FreeTextEntry1] : PAULA FERGUSON  is being evaluated at the request of Dr. Troy for an opinion re: colon cancer screening / family  h/o esophageal cancer / GERD  Denies nausea, vomiting, fever, chills diarrhea, constipation, melena, BRBPR  Follows with Dr. DEION Valdez ( hemochromatosis carrier / no iron on MRI)

## 2024-06-06 NOTE — CONSULT LETTER
[Dear  ___] : Dear  [unfilled], [Consult Letter:] : I had the pleasure of evaluating your patient, [unfilled]. [Please see my note below.] : Please see my note below. [Consult Closing:] : Thank you very much for allowing me to participate in the care of this patient.  If you have any questions, please do not hesitate to contact me. [Sincerely,] : Sincerely, [FreeTextEntry3] : Marito Larose MD tel: [phone] fax: [phone]

## 2024-06-06 NOTE — ASSESSMENT
[FreeTextEntry1] : 1. GERD: controlled with diet. EGDS planned  2. Colon cancer sc reening:  Colonoscopy scheduled  Pertinent available records reviewed Risks of the procedures including but not limited to bleeding / perforation / infection / anesthesia complication / missed  lesions explained to the  patient . The patient expressed understanding and a desire to proceed with the procedures.  Risk of not doing procedures includes but is not limited to missed or delayed diagnosis of gastric pathology, colon cancer or other gastrointestinal pathology  A consultation note was provided to the referring provider The patient is at increased risk of anesthesia / procedure related complications  secondary to h/o malignant hyperthermia

## 2024-06-26 ENCOUNTER — APPOINTMENT (OUTPATIENT)
Dept: HEMATOLOGY ONCOLOGY | Facility: CLINIC | Age: 64
End: 2024-06-26

## 2024-09-30 ENCOUNTER — RESULT REVIEW (OUTPATIENT)
Age: 64
End: 2024-09-30

## 2024-10-01 ENCOUNTER — APPOINTMENT (OUTPATIENT)
Dept: GASTROENTEROLOGY | Facility: HOSPITAL | Age: 64
End: 2024-10-01

## 2024-10-04 ENCOUNTER — NON-APPOINTMENT (OUTPATIENT)
Age: 64
End: 2024-10-04

## 2025-01-05 PROBLEM — M25.551 ACUTE PAIN OF RIGHT HIP: Status: ACTIVE | Noted: 2025-01-05

## 2025-01-05 PROBLEM — M25.552 ACUTE PAIN OF LEFT HIP: Status: ACTIVE | Noted: 2025-01-05

## 2025-01-06 ENCOUNTER — APPOINTMENT (OUTPATIENT)
Dept: ORTHOPEDIC SURGERY | Facility: CLINIC | Age: 65
End: 2025-01-06
Payer: COMMERCIAL

## 2025-01-06 VITALS — OXYGEN SATURATION: 95 % | HEART RATE: 87 BPM | DIASTOLIC BLOOD PRESSURE: 68 MMHG | SYSTOLIC BLOOD PRESSURE: 168 MMHG

## 2025-01-06 PROCEDURE — 73523 X-RAY EXAM HIPS BI 5/> VIEWS: CPT

## 2025-01-06 PROCEDURE — 99203 OFFICE O/P NEW LOW 30 MIN: CPT

## 2025-01-06 RX ORDER — CELECOXIB 200 MG/1
200 CAPSULE ORAL TWICE DAILY
Qty: 60 | Refills: 5 | Status: ACTIVE | COMMUNITY
Start: 2025-01-06 | End: 1900-01-01

## 2025-02-04 ENCOUNTER — APPOINTMENT (OUTPATIENT)
Dept: INTERNAL MEDICINE | Facility: CLINIC | Age: 65
End: 2025-02-04
Payer: COMMERCIAL

## 2025-02-04 VITALS
OXYGEN SATURATION: 97 % | HEART RATE: 82 BPM | SYSTOLIC BLOOD PRESSURE: 138 MMHG | RESPIRATION RATE: 16 BRPM | TEMPERATURE: 97.2 F | DIASTOLIC BLOOD PRESSURE: 82 MMHG | BODY MASS INDEX: 32.57 KG/M2 | HEIGHT: 62 IN | WEIGHT: 177 LBS

## 2025-02-04 DIAGNOSIS — E66.3 OVERWEIGHT: ICD-10-CM

## 2025-02-04 DIAGNOSIS — H54.62 UNQUALIFIED VISUAL LOSS, LEFT EYE, NORMAL VISION RIGHT EYE: ICD-10-CM

## 2025-02-04 DIAGNOSIS — E78.5 HYPERLIPIDEMIA, UNSPECIFIED: ICD-10-CM

## 2025-02-04 DIAGNOSIS — Z00.00 ENCOUNTER FOR GENERAL ADULT MEDICAL EXAMINATION W/OUT ABNORMAL FINDINGS: ICD-10-CM

## 2025-02-04 DIAGNOSIS — R73.03 PREDIABETES.: ICD-10-CM

## 2025-02-04 DIAGNOSIS — R79.89 OTHER SPECIFIED ABNORMAL FINDINGS OF BLOOD CHEMISTRY: ICD-10-CM

## 2025-02-04 DIAGNOSIS — I10 ESSENTIAL (PRIMARY) HYPERTENSION: ICD-10-CM

## 2025-02-04 PROCEDURE — 99386 PREV VISIT NEW AGE 40-64: CPT

## 2025-02-04 PROCEDURE — 36415 COLL VENOUS BLD VENIPUNCTURE: CPT

## 2025-02-05 ENCOUNTER — TRANSCRIPTION ENCOUNTER (OUTPATIENT)
Age: 65
End: 2025-02-05

## 2025-02-05 LAB
ALBUMIN SERPL ELPH-MCNC: 4.7 G/DL
ALP BLD-CCNC: 116 U/L
ALT SERPL-CCNC: 55 U/L
ANION GAP SERPL CALC-SCNC: 17 MMOL/L
AST SERPL-CCNC: 42 U/L
BILIRUB SERPL-MCNC: 0.6 MG/DL
BUN SERPL-MCNC: 19 MG/DL
CALCIUM SERPL-MCNC: 9.8 MG/DL
CHLORIDE SERPL-SCNC: 98 MMOL/L
CHOLEST SERPL-MCNC: 186 MG/DL
CO2 SERPL-SCNC: 24 MMOL/L
CREAT SERPL-MCNC: 0.8 MG/DL
CREAT SPEC-SCNC: 401 MG/DL
EGFR: 82 ML/MIN/1.73M2
ESTIMATED AVERAGE GLUCOSE: 117 MG/DL
FERRITIN SERPL-MCNC: 461 NG/ML
GLUCOSE SERPL-MCNC: 104 MG/DL
HBA1C MFR BLD HPLC: 5.7 %
HCT VFR BLD CALC: 42.7 %
HDLC SERPL-MCNC: 64 MG/DL
HGB BLD-MCNC: 15 G/DL
IRON SATN MFR SERPL: 34 %
IRON SERPL-MCNC: 110 UG/DL
LDLC SERPL CALC-MCNC: 102 MG/DL
MCHC RBC-ENTMCNC: 32.5 PG
MCHC RBC-ENTMCNC: 35.1 G/DL
MCV RBC AUTO: 92.6 FL
MICROALBUMIN 24H UR DL<=1MG/L-MCNC: 3 MG/DL
MICROALBUMIN/CREAT 24H UR-RTO: 8 MG/G
NONHDLC SERPL-MCNC: 122 MG/DL
PLATELET # BLD AUTO: 292 K/UL
POTASSIUM SERPL-SCNC: 3.8 MMOL/L
PROT SERPL-MCNC: 7.7 G/DL
RBC # BLD: 4.61 M/UL
RBC # FLD: 12 %
SODIUM SERPL-SCNC: 140 MMOL/L
TIBC SERPL-MCNC: 323 UG/DL
TRIGL SERPL-MCNC: 107 MG/DL
TSH SERPL-ACNC: 1.38 UIU/ML
UIBC SERPL-MCNC: 213 UG/DL
WBC # FLD AUTO: 9.67 K/UL

## 2025-02-05 RX ORDER — TIRZEPATIDE 2.5 MG/.5ML
2.5 INJECTION, SOLUTION SUBCUTANEOUS
Qty: 4 | Refills: 0 | Status: ACTIVE | COMMUNITY
Start: 2025-02-04

## 2025-02-19 RX ORDER — METFORMIN HYDROCHLORIDE 500 MG/1
500 TABLET, COATED ORAL DAILY
Qty: 90 | Refills: 3 | Status: ACTIVE | COMMUNITY
Start: 2025-02-19 | End: 1900-01-01

## 2025-02-19 RX ORDER — HYDROCHLOROTHIAZIDE 12.5 MG/1
12.5 TABLET ORAL DAILY
Qty: 90 | Refills: 0 | Status: ACTIVE | COMMUNITY
Start: 2025-02-19 | End: 1900-01-01

## 2025-03-10 ENCOUNTER — RESULT REVIEW (OUTPATIENT)
Age: 65
End: 2025-03-10

## 2025-03-10 ENCOUNTER — APPOINTMENT (OUTPATIENT)
Dept: HEMATOLOGY ONCOLOGY | Facility: CLINIC | Age: 65
End: 2025-03-10
Payer: COMMERCIAL

## 2025-03-10 ENCOUNTER — APPOINTMENT (OUTPATIENT)
Dept: INTERNAL MEDICINE | Facility: CLINIC | Age: 65
End: 2025-03-10
Payer: COMMERCIAL

## 2025-03-10 VITALS
DIASTOLIC BLOOD PRESSURE: 83 MMHG | OXYGEN SATURATION: 97 % | RESPIRATION RATE: 16 BRPM | HEART RATE: 84 BPM | HEIGHT: 62 IN | BODY MASS INDEX: 32.67 KG/M2 | TEMPERATURE: 96.9 F | WEIGHT: 177.56 LBS | SYSTOLIC BLOOD PRESSURE: 150 MMHG

## 2025-03-10 VITALS
HEART RATE: 85 BPM | OXYGEN SATURATION: 98 % | TEMPERATURE: 98.7 F | SYSTOLIC BLOOD PRESSURE: 130 MMHG | DIASTOLIC BLOOD PRESSURE: 72 MMHG

## 2025-03-10 DIAGNOSIS — Z14.8 GENETIC CARRIER OF OTHER DISEASE: ICD-10-CM

## 2025-03-10 DIAGNOSIS — R09.89 OTHER SPECIFIED SYMPTOMS AND SIGNS INVOLVING THE CIRCULATORY AND RESPIRATORY SYSTEMS: ICD-10-CM

## 2025-03-10 DIAGNOSIS — R79.89 OTHER SPECIFIED ABNORMAL FINDINGS OF BLOOD CHEMISTRY: ICD-10-CM

## 2025-03-10 DIAGNOSIS — J02.9 ACUTE PHARYNGITIS, UNSPECIFIED: ICD-10-CM

## 2025-03-10 DIAGNOSIS — D72.829 ELEVATED WHITE BLOOD CELL COUNT, UNSPECIFIED: ICD-10-CM

## 2025-03-10 DIAGNOSIS — H54.62 UNQUALIFIED VISUAL LOSS, LEFT EYE, NORMAL VISION RIGHT EYE: ICD-10-CM

## 2025-03-10 LAB — S PYO AG SPEC QL IA: NEGATIVE

## 2025-03-10 PROCEDURE — 99213 OFFICE O/P EST LOW 20 MIN: CPT

## 2025-03-10 PROCEDURE — 99214 OFFICE O/P EST MOD 30 MIN: CPT

## 2025-03-10 PROCEDURE — 36415 COLL VENOUS BLD VENIPUNCTURE: CPT

## 2025-03-10 PROCEDURE — 87880 STREP A ASSAY W/OPTIC: CPT | Mod: QW

## 2025-03-11 ENCOUNTER — RX RENEWAL (OUTPATIENT)
Age: 65
End: 2025-03-11

## 2025-05-19 ENCOUNTER — APPOINTMENT (OUTPATIENT)
Dept: INTERNAL MEDICINE | Facility: CLINIC | Age: 65
End: 2025-05-19
Payer: COMMERCIAL

## 2025-05-19 VITALS
HEIGHT: 62 IN | HEART RATE: 90 BPM | BODY MASS INDEX: 31.28 KG/M2 | DIASTOLIC BLOOD PRESSURE: 80 MMHG | OXYGEN SATURATION: 97 % | WEIGHT: 170 LBS | SYSTOLIC BLOOD PRESSURE: 124 MMHG

## 2025-05-19 DIAGNOSIS — M25.552 PAIN IN RIGHT HIP: ICD-10-CM

## 2025-05-19 DIAGNOSIS — M25.551 PAIN IN RIGHT HIP: ICD-10-CM

## 2025-05-19 DIAGNOSIS — I10 ESSENTIAL (PRIMARY) HYPERTENSION: ICD-10-CM

## 2025-05-19 DIAGNOSIS — G89.29 PAIN IN RIGHT HIP: ICD-10-CM

## 2025-05-19 DIAGNOSIS — R79.89 OTHER SPECIFIED ABNORMAL FINDINGS OF BLOOD CHEMISTRY: ICD-10-CM

## 2025-05-19 DIAGNOSIS — E78.5 HYPERLIPIDEMIA, UNSPECIFIED: ICD-10-CM

## 2025-05-19 DIAGNOSIS — R73.03 PREDIABETES.: ICD-10-CM

## 2025-05-19 PROCEDURE — 99214 OFFICE O/P EST MOD 30 MIN: CPT

## 2025-05-19 PROCEDURE — 36415 COLL VENOUS BLD VENIPUNCTURE: CPT

## 2025-05-20 DIAGNOSIS — R94.5 ABNORMAL RESULTS OF LIVER FUNCTION STUDIES: ICD-10-CM

## 2025-05-20 LAB
ALBUMIN SERPL ELPH-MCNC: 4.7 G/DL
ALP BLD-CCNC: 113 U/L
ALT SERPL-CCNC: 79 U/L
ANION GAP SERPL CALC-SCNC: 18 MMOL/L
AST SERPL-CCNC: 47 U/L
BILIRUB SERPL-MCNC: 0.6 MG/DL
BUN SERPL-MCNC: 14 MG/DL
CALCIUM SERPL-MCNC: 10.3 MG/DL
CHLORIDE SERPL-SCNC: 98 MMOL/L
CHOLEST SERPL-MCNC: 167 MG/DL
CO2 SERPL-SCNC: 21 MMOL/L
CREAT SERPL-MCNC: 0.67 MG/DL
EGFRCR SERPLBLD CKD-EPI 2021: 98 ML/MIN/1.73M2
ESTIMATED AVERAGE GLUCOSE: 105 MG/DL
GLUCOSE SERPL-MCNC: 94 MG/DL
HBA1C MFR BLD HPLC: 5.3 %
HDLC SERPL-MCNC: 51 MG/DL
LDLC SERPL-MCNC: 90 MG/DL
NONHDLC SERPL-MCNC: 116 MG/DL
POTASSIUM SERPL-SCNC: 3.4 MMOL/L
PROT SERPL-MCNC: 7.6 G/DL
SODIUM SERPL-SCNC: 137 MMOL/L
TRIGL SERPL-MCNC: 150 MG/DL

## 2025-07-09 RX ORDER — SEMAGLUTIDE 1 MG/.5ML
1 INJECTION, SOLUTION SUBCUTANEOUS
Qty: 1 | Refills: 0 | Status: ACTIVE | COMMUNITY
Start: 2025-07-09 | End: 1900-01-01

## 2025-07-29 ENCOUNTER — RX RENEWAL (OUTPATIENT)
Age: 65
End: 2025-07-29

## 2025-08-20 ENCOUNTER — APPOINTMENT (OUTPATIENT)
Dept: INTERNAL MEDICINE | Facility: CLINIC | Age: 65
End: 2025-08-20

## 2025-09-15 ENCOUNTER — APPOINTMENT (OUTPATIENT)
Dept: HEMATOLOGY ONCOLOGY | Facility: CLINIC | Age: 65
End: 2025-09-15

## 2025-09-17 ENCOUNTER — APPOINTMENT (OUTPATIENT)
Dept: INTERNAL MEDICINE | Facility: CLINIC | Age: 65
End: 2025-09-17